# Patient Record
Sex: FEMALE | Race: BLACK OR AFRICAN AMERICAN | NOT HISPANIC OR LATINO | URBAN - METROPOLITAN AREA
[De-identification: names, ages, dates, MRNs, and addresses within clinical notes are randomized per-mention and may not be internally consistent; named-entity substitution may affect disease eponyms.]

---

## 2017-04-29 ENCOUNTER — EMERGENCY (EMERGENCY)
Facility: HOSPITAL | Age: 42
LOS: 1 days | Discharge: HOME | End: 2017-04-29
Attending: EMERGENCY MEDICINE
Payer: MEDICAID

## 2017-04-29 VITALS
HEIGHT: 64 IN | TEMPERATURE: 99 F | OXYGEN SATURATION: 100 % | WEIGHT: 149.91 LBS | RESPIRATION RATE: 19 BRPM | HEART RATE: 100 BPM | DIASTOLIC BLOOD PRESSURE: 69 MMHG | SYSTOLIC BLOOD PRESSURE: 132 MMHG

## 2017-04-29 DIAGNOSIS — Z98.89 OTHER SPECIFIED POSTPROCEDURAL STATES: Chronic | ICD-10-CM

## 2017-04-29 DIAGNOSIS — B36.9 SUPERFICIAL MYCOSIS, UNSPECIFIED: ICD-10-CM

## 2017-04-29 DIAGNOSIS — Z98.51 TUBAL LIGATION STATUS: ICD-10-CM

## 2017-04-29 DIAGNOSIS — M54.5 LOW BACK PAIN: ICD-10-CM

## 2017-04-29 DIAGNOSIS — Z98.51 TUBAL LIGATION STATUS: Chronic | ICD-10-CM

## 2017-04-29 DIAGNOSIS — A41.9 SEPSIS, UNSPECIFIED ORGANISM: ICD-10-CM

## 2017-04-29 DIAGNOSIS — Z98.890 OTHER SPECIFIED POSTPROCEDURAL STATES: Chronic | ICD-10-CM

## 2017-04-29 DIAGNOSIS — Z98.890 OTHER SPECIFIED POSTPROCEDURAL STATES: ICD-10-CM

## 2017-04-29 PROCEDURE — 99283 EMERGENCY DEPT VISIT LOW MDM: CPT

## 2017-04-29 RX ORDER — METHOCARBAMOL 500 MG/1
750 TABLET, FILM COATED ORAL ONCE
Qty: 0 | Refills: 0 | Status: COMPLETED | OUTPATIENT
Start: 2017-04-29 | End: 2017-04-29

## 2017-04-29 RX ORDER — DIPHENHYDRAMINE HCL 50 MG
50 CAPSULE ORAL ONCE
Qty: 0 | Refills: 0 | Status: COMPLETED | OUTPATIENT
Start: 2017-04-29 | End: 2017-04-29

## 2017-04-29 RX ADMIN — Medication 60 MILLIGRAM(S): at 22:58

## 2017-04-29 RX ADMIN — METHOCARBAMOL 750 MILLIGRAM(S): 500 TABLET, FILM COATED ORAL at 22:58

## 2017-04-29 RX ADMIN — Medication 50 MILLIGRAM(S): at 22:57

## 2017-04-29 NOTE — ED STATDOCS - MEDICAL DECISION MAKING DETAILS
Likely allergic rash, will treat with Benadryl. Will treat back pain with steroid and muscle relaxant, check XR, and re-assess.

## 2017-04-29 NOTE — ED STATDOCS - PROGRESS NOTE DETAILS
Pt reports significant relief of presenting symptoms. Pt able to bear weight and ambulate in the ED without difficulty. Pt stable for d/c with spine center f/u.

## 2017-04-29 NOTE — ED STATDOCS - OBJECTIVE STATEMENT
40 y/o female presents to ED c/o left lower back pain x 2 days, exacerbated with movement and bending forward at the waist. 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 Denies difficulty urinating. Pt has not taken any medication for pain. She also notes pruritic rash to her buttock, posterior thighs, and vaginal area that began today. She denies applying new cream or using new soap. No PMHx. No further complaints at this time.

## 2017-04-29 NOTE — ED STATDOCS - NS ED MD SCRIBE ATTENDING SCRIBE SECTIONS
VITAL SIGNS( Pullset)/HIV/PHYSICAL EXAM/DISPOSITION/REVIEW OF SYSTEMS/HISTORY OF PRESENT ILLNESS/PAST MEDICAL/SURGICAL/SOCIAL HISTORY

## 2017-04-29 NOTE — ED STATDOCS - ATTENDING CONTRIBUTION TO CARE
I, Blanca Francisco, performed the initial face to face bedside interview with this patient regarding history of present illness, review of symptoms and relevant past medical, social and family history.  I completed an independent physical examination.  I was the initial provider who evaluated this patient. I have signed out the follow up of any pending tests (i.e. labs, radiological studies) to the ACP.  I have communicated the patient’s plan of care and disposition with the ACP.  The history, relevant review of systems, past medical and surgical history, medical decision making, and physical examination was documented by the scribe in my presence and I attest to the accuracy of the documentation.

## 2017-04-30 PROCEDURE — 99283 EMERGENCY DEPT VISIT LOW MDM: CPT | Mod: 25

## 2017-04-30 PROCEDURE — 72100 X-RAY EXAM L-S SPINE 2/3 VWS: CPT

## 2017-04-30 PROCEDURE — 72100 X-RAY EXAM L-S SPINE 2/3 VWS: CPT | Mod: 26

## 2017-04-30 RX ORDER — METHOCARBAMOL 500 MG/1
2 TABLET, FILM COATED ORAL
Qty: 18 | Refills: 0 | OUTPATIENT
Start: 2017-04-30 | End: 2017-05-03

## 2017-04-30 RX ORDER — IBUPROFEN 200 MG
1 TABLET ORAL
Qty: 28 | Refills: 0 | OUTPATIENT
Start: 2017-04-30 | End: 2017-05-07

## 2017-05-02 ENCOUNTER — INPATIENT (INPATIENT)
Facility: HOSPITAL | Age: 42
LOS: 3 days | Discharge: HOME | DRG: 872 | End: 2017-05-06
Attending: INTERNAL MEDICINE | Admitting: HOSPITALIST
Payer: MEDICAID

## 2017-05-02 VITALS
DIASTOLIC BLOOD PRESSURE: 75 MMHG | HEART RATE: 114 BPM | HEIGHT: 64 IN | RESPIRATION RATE: 25 BRPM | OXYGEN SATURATION: 100 % | WEIGHT: 149.91 LBS | SYSTOLIC BLOOD PRESSURE: 116 MMHG | TEMPERATURE: 105 F

## 2017-05-02 DIAGNOSIS — Z98.890 OTHER SPECIFIED POSTPROCEDURAL STATES: Chronic | ICD-10-CM

## 2017-05-02 DIAGNOSIS — Z98.51 TUBAL LIGATION STATUS: Chronic | ICD-10-CM

## 2017-05-02 DIAGNOSIS — Z98.89 OTHER SPECIFIED POSTPROCEDURAL STATES: Chronic | ICD-10-CM

## 2017-05-02 LAB
ALBUMIN SERPL ELPH-MCNC: 4.1 G/DL — SIGNIFICANT CHANGE UP (ref 3.3–5.2)
ALP SERPL-CCNC: 60 U/L — SIGNIFICANT CHANGE UP (ref 40–120)
ALT FLD-CCNC: 23 U/L — SIGNIFICANT CHANGE UP
ANION GAP SERPL CALC-SCNC: 13 MMOL/L — SIGNIFICANT CHANGE UP (ref 5–17)
ANISOCYTOSIS BLD QL: SLIGHT — SIGNIFICANT CHANGE UP
AST SERPL-CCNC: 54 U/L — HIGH
BILIRUB SERPL-MCNC: 0.3 MG/DL — LOW (ref 0.4–2)
BUN SERPL-MCNC: 8 MG/DL — SIGNIFICANT CHANGE UP (ref 8–20)
CALCIUM SERPL-MCNC: 9.2 MG/DL — SIGNIFICANT CHANGE UP (ref 8.6–10.2)
CHLORIDE SERPL-SCNC: 94 MMOL/L — LOW (ref 98–107)
CO2 SERPL-SCNC: 23 MMOL/L — SIGNIFICANT CHANGE UP (ref 22–29)
CREAT SERPL-MCNC: 0.85 MG/DL — SIGNIFICANT CHANGE UP (ref 0.5–1.3)
DACRYOCYTES BLD QL SMEAR: SLIGHT — SIGNIFICANT CHANGE UP
ELLIPTOCYTES BLD QL SMEAR: SLIGHT — SIGNIFICANT CHANGE UP
EOSINOPHIL NFR BLD AUTO: 1 % — SIGNIFICANT CHANGE UP (ref 0–5)
GLUCOSE SERPL-MCNC: 100 MG/DL — SIGNIFICANT CHANGE UP (ref 70–115)
HCG SERPL-ACNC: <2 MIU/ML — SIGNIFICANT CHANGE UP
HCT VFR BLD CALC: 30.6 % — LOW (ref 37–47)
HGB BLD-MCNC: 9.8 G/DL — LOW (ref 12–16)
HYPOCHROMIA BLD QL: SIGNIFICANT CHANGE UP
LACTATE BLDV-MCNC: 3 MMOL/L — HIGH (ref 0.7–2)
LIDOCAIN IGE QN: 47 U/L — SIGNIFICANT CHANGE UP (ref 22–51)
LYMPHOCYTES # BLD AUTO: 15 % — LOW (ref 20–55)
MAGNESIUM SERPL-MCNC: 2 MG/DL — SIGNIFICANT CHANGE UP (ref 1.8–2.5)
MCHC RBC-ENTMCNC: 20.9 PG — LOW (ref 27–31)
MCHC RBC-ENTMCNC: 32 G/DL — SIGNIFICANT CHANGE UP (ref 32–36)
MCV RBC AUTO: 65.1 FL — LOW (ref 81–99)
MICROCYTES BLD QL: SLIGHT — SIGNIFICANT CHANGE UP
MONOCYTES NFR BLD AUTO: 2 % — LOW (ref 3–10)
NEUTROPHILS NFR BLD AUTO: 82 % — HIGH (ref 37–73)
OVALOCYTES BLD QL SMEAR: SLIGHT — SIGNIFICANT CHANGE UP
PLAT MORPH BLD: NORMAL — SIGNIFICANT CHANGE UP
PLATELET # BLD AUTO: 150 K/UL — SIGNIFICANT CHANGE UP (ref 150–400)
POIKILOCYTOSIS BLD QL AUTO: SLIGHT — SIGNIFICANT CHANGE UP
POTASSIUM SERPL-MCNC: 6.1 MMOL/L — CRITICAL HIGH (ref 3.5–5.3)
POTASSIUM SERPL-SCNC: 6.1 MMOL/L — CRITICAL HIGH (ref 3.5–5.3)
PROT SERPL-MCNC: 8.3 G/DL — SIGNIFICANT CHANGE UP (ref 6.6–8.7)
RBC # BLD: 4.7 M/UL — SIGNIFICANT CHANGE UP (ref 4.4–5.2)
RBC # FLD: 20.1 % — HIGH (ref 11–15.6)
RBC BLD AUTO: ABNORMAL
SODIUM SERPL-SCNC: 130 MMOL/L — LOW (ref 135–145)
TARGETS BLD QL SMEAR: SLIGHT — SIGNIFICANT CHANGE UP
TSH SERPL-MCNC: 0.19 UIU/ML — LOW (ref 0.27–4.2)
WBC # BLD: 3.4 K/UL — LOW (ref 4.8–10.8)
WBC # FLD AUTO: 3.4 K/UL — LOW (ref 4.8–10.8)

## 2017-05-02 PROCEDURE — 72157 MRI CHEST SPINE W/O & W/DYE: CPT | Mod: 26

## 2017-05-02 PROCEDURE — 99291 CRITICAL CARE FIRST HOUR: CPT

## 2017-05-02 PROCEDURE — 71010: CPT | Mod: 26

## 2017-05-02 RX ORDER — MEROPENEM 1 G/30ML
1000 INJECTION INTRAVENOUS ONCE
Qty: 0 | Refills: 0 | Status: COMPLETED | OUTPATIENT
Start: 2017-05-02 | End: 2017-05-02

## 2017-05-02 RX ORDER — SODIUM CHLORIDE 9 MG/ML
3000 INJECTION INTRAMUSCULAR; INTRAVENOUS; SUBCUTANEOUS ONCE
Qty: 0 | Refills: 0 | Status: COMPLETED | OUTPATIENT
Start: 2017-05-02 | End: 2017-05-02

## 2017-05-02 RX ORDER — ACETAMINOPHEN 500 MG
975 TABLET ORAL ONCE
Qty: 0 | Refills: 0 | Status: COMPLETED | OUTPATIENT
Start: 2017-05-02 | End: 2017-05-02

## 2017-05-02 RX ORDER — FENTANYL CITRATE 50 UG/ML
50 INJECTION INTRAVENOUS ONCE
Qty: 0 | Refills: 0 | Status: DISCONTINUED | OUTPATIENT
Start: 2017-05-02 | End: 2017-05-02

## 2017-05-02 RX ORDER — VANCOMYCIN HCL 1 G
1500 VIAL (EA) INTRAVENOUS ONCE
Qty: 0 | Refills: 0 | Status: COMPLETED | OUTPATIENT
Start: 2017-05-02 | End: 2017-05-03

## 2017-05-02 RX ORDER — IBUPROFEN 200 MG
800 TABLET ORAL ONCE
Qty: 0 | Refills: 0 | Status: COMPLETED | OUTPATIENT
Start: 2017-05-02 | End: 2017-05-02

## 2017-05-02 RX ORDER — SODIUM CHLORIDE 9 MG/ML
3 INJECTION INTRAMUSCULAR; INTRAVENOUS; SUBCUTANEOUS ONCE
Qty: 0 | Refills: 0 | Status: COMPLETED | OUTPATIENT
Start: 2017-05-02 | End: 2017-05-02

## 2017-05-02 RX ADMIN — Medication 800 MILLIGRAM(S): at 22:16

## 2017-05-02 RX ADMIN — SODIUM CHLORIDE 3000 MILLILITER(S): 9 INJECTION INTRAMUSCULAR; INTRAVENOUS; SUBCUTANEOUS at 22:16

## 2017-05-02 RX ADMIN — MEROPENEM 200 MILLIGRAM(S): 1 INJECTION INTRAVENOUS at 22:24

## 2017-05-02 RX ADMIN — Medication 800 MILLIGRAM(S): at 22:50

## 2017-05-02 RX ADMIN — SODIUM CHLORIDE 3 MILLILITER(S): 9 INJECTION INTRAMUSCULAR; INTRAVENOUS; SUBCUTANEOUS at 22:14

## 2017-05-02 RX ADMIN — Medication 975 MILLIGRAM(S): at 22:16

## 2017-05-02 RX ADMIN — FENTANYL CITRATE 50 MICROGRAM(S): 50 INJECTION INTRAVENOUS at 22:16

## 2017-05-02 RX ADMIN — FENTANYL CITRATE 50 MICROGRAM(S): 50 INJECTION INTRAVENOUS at 22:50

## 2017-05-02 NOTE — ED PROVIDER NOTE - PROGRESS NOTE DETAILS
Received call from MRI tech pt is refusing MRI. when pt returns will discuss importance of MRI. explained importance of mri and that pt could be paralyzed w/o it. pt will be admitted for high fever, less likely but given high fever and mucosal lesions possibility of Nato Johnsons syndrome exists. will give steroids and benadryl and see what medicine thinks. If they agree Monty Lopez will transfer to [facility] burn [place]

## 2017-05-02 NOTE — ED PROVIDER NOTE - MEDICAL DECISION MAKING DETAILS
Sepsis workup: Empiric abx, IV fluids, Antipyretics, admit to hospital and re-assess Sepsis workup: Empiric abx, IV fluids, Antipyretics, admit to hospital and re-assess,

## 2017-05-02 NOTE — ED PROVIDER NOTE - NS ED MD SCRIBE ATTENDING SCRIBE SECTIONS
HIV/VITAL SIGNS( Pullset)/HISTORY OF PRESENT ILLNESS/PAST MEDICAL/SURGICAL/SOCIAL HISTORY/DISPOSITION/PHYSICAL EXAM/REVIEW OF SYSTEMS

## 2017-05-02 NOTE — ED PROVIDER NOTE - CRITICAL CARE PROVIDED
interpretation of diagnostic studies/documentation/consultation with other physicians/direct patient care (not related to procedure)/additional history taking

## 2017-05-02 NOTE — ED ADULT NURSE NOTE - OBJECTIVE STATEMENT
patient states has back pain, had allergic rxn to unknown arms since saturday, patient with fever taking motrin, last dose 4 hours prior to arrival,  today 2 hours prior to arrival as per patient, sepsis protocol initiated due to fever

## 2017-05-02 NOTE — ED PROVIDER NOTE - CARE PLAN
Principal Discharge DX:	Sepsis, due to unspecified organism  Secondary Diagnosis:	Cellulitis, unspecified cellulitis site

## 2017-05-02 NOTE — ED PROVIDER NOTE - OBJECTIVE STATEMENT
40 y/o female BIBA presenting w/ 105F fever and severe low back pain. Pt in distress from pain. Denies dysuria, vaginal discharge. Called to bedside for code sepsis. No further complaints at this time.

## 2017-05-03 DIAGNOSIS — A41.9 SEPSIS, UNSPECIFIED ORGANISM: ICD-10-CM

## 2017-05-03 DIAGNOSIS — L03.90 CELLULITIS, UNSPECIFIED: ICD-10-CM

## 2017-05-03 DIAGNOSIS — N73.0 ACUTE PARAMETRITIS AND PELVIC CELLULITIS: ICD-10-CM

## 2017-05-03 DIAGNOSIS — R50.9 FEVER, UNSPECIFIED: ICD-10-CM

## 2017-05-03 DIAGNOSIS — D64.9 ANEMIA, UNSPECIFIED: ICD-10-CM

## 2017-05-03 DIAGNOSIS — R21 RASH AND OTHER NONSPECIFIC SKIN ERUPTION: ICD-10-CM

## 2017-05-03 DIAGNOSIS — M54.9 DORSALGIA, UNSPECIFIED: ICD-10-CM

## 2017-05-03 DIAGNOSIS — Z29.9 ENCOUNTER FOR PROPHYLACTIC MEASURES, UNSPECIFIED: ICD-10-CM

## 2017-05-03 LAB
ALBUMIN SERPL ELPH-MCNC: 3.2 G/DL — LOW (ref 3.3–5.2)
ALP SERPL-CCNC: 53 U/L — SIGNIFICANT CHANGE UP (ref 40–120)
ALT FLD-CCNC: 15 U/L — SIGNIFICANT CHANGE UP
ANION GAP SERPL CALC-SCNC: 13 MMOL/L — SIGNIFICANT CHANGE UP (ref 5–17)
APPEARANCE UR: CLEAR — SIGNIFICANT CHANGE UP
APTT BLD: 26 SEC — LOW (ref 27.5–37.4)
AST SERPL-CCNC: 25 U/L — SIGNIFICANT CHANGE UP
BILIRUB SERPL-MCNC: 0.2 MG/DL — LOW (ref 0.4–2)
BILIRUB UR-MCNC: NEGATIVE — SIGNIFICANT CHANGE UP
BUN SERPL-MCNC: 7 MG/DL — LOW (ref 8–20)
CALCIUM SERPL-MCNC: 7.7 MG/DL — LOW (ref 8.6–10.2)
CHLORIDE SERPL-SCNC: 108 MMOL/L — HIGH (ref 98–107)
CK SERPL-CCNC: 107 U/L — SIGNIFICANT CHANGE UP (ref 25–170)
CO2 SERPL-SCNC: 20 MMOL/L — LOW (ref 22–29)
COLOR SPEC: YELLOW — SIGNIFICANT CHANGE UP
CREAT SERPL-MCNC: 0.55 MG/DL — SIGNIFICANT CHANGE UP (ref 0.5–1.3)
CRP SERPL-MCNC: 0.3 MG/DL — SIGNIFICANT CHANGE UP (ref 0–0.4)
D DIMER BLD IA.RAPID-MCNC: 2934 NG/ML DDU — HIGH
DIFF PNL FLD: NEGATIVE — SIGNIFICANT CHANGE UP
ERYTHROCYTE [SEDIMENTATION RATE] IN BLOOD: 10 MM/HR — SIGNIFICANT CHANGE UP (ref 0–20)
FIBRINOGEN PPP-MCNC: 367 MG/DL — SIGNIFICANT CHANGE UP (ref 310–510)
GLUCOSE SERPL-MCNC: 126 MG/DL — HIGH (ref 70–115)
GLUCOSE UR QL: NEGATIVE MG/DL — SIGNIFICANT CHANGE UP
HIV 1 & 2 AB SERPL IA.RAPID: SIGNIFICANT CHANGE UP
INR BLD: 1.13 RATIO — SIGNIFICANT CHANGE UP (ref 0.88–1.16)
KETONES UR-MCNC: NEGATIVE — SIGNIFICANT CHANGE UP
LDH SERPL L TO P-CCNC: 273 U/L — HIGH (ref 98–192)
LEUKOCYTE ESTERASE UR-ACNC: NEGATIVE — SIGNIFICANT CHANGE UP
NITRITE UR-MCNC: NEGATIVE — SIGNIFICANT CHANGE UP
PH UR: 6 — SIGNIFICANT CHANGE UP (ref 5–8)
POTASSIUM SERPL-MCNC: 3.8 MMOL/L — SIGNIFICANT CHANGE UP (ref 3.5–5.3)
POTASSIUM SERPL-SCNC: 3.8 MMOL/L — SIGNIFICANT CHANGE UP (ref 3.5–5.3)
PROCALCITONIN SERPL-MCNC: 0.16 NG/ML — HIGH (ref 0–0.05)
PROT SERPL-MCNC: 6.8 G/DL — SIGNIFICANT CHANGE UP (ref 6.6–8.7)
PROT UR-MCNC: NEGATIVE MG/DL — SIGNIFICANT CHANGE UP
PROTHROM AB SERPL-ACNC: 12.5 SEC — SIGNIFICANT CHANGE UP (ref 9.8–12.7)
RPR SERPL-ACNC: SIGNIFICANT CHANGE UP
SODIUM SERPL-SCNC: 141 MMOL/L — SIGNIFICANT CHANGE UP (ref 135–145)
SP GR SPEC: 1 — LOW (ref 1.01–1.02)
T3 SERPL-MCNC: 68 NG/DL — LOW (ref 80–200)
T4 AB SER-ACNC: 5 UG/DL — SIGNIFICANT CHANGE UP (ref 4.5–12)
UROBILINOGEN FLD QL: NEGATIVE MG/DL — SIGNIFICANT CHANGE UP

## 2017-05-03 PROCEDURE — 93010 ELECTROCARDIOGRAM REPORT: CPT

## 2017-05-03 PROCEDURE — 99223 1ST HOSP IP/OBS HIGH 75: CPT

## 2017-05-03 PROCEDURE — 74176 CT ABD & PELVIS W/O CONTRAST: CPT | Mod: 26

## 2017-05-03 PROCEDURE — 99223 1ST HOSP IP/OBS HIGH 75: CPT | Mod: AI

## 2017-05-03 PROCEDURE — 72131 CT LUMBAR SPINE W/O DYE: CPT | Mod: 26

## 2017-05-03 PROCEDURE — 71275 CT ANGIOGRAPHY CHEST: CPT | Mod: 26

## 2017-05-03 PROCEDURE — 76856 US EXAM PELVIC COMPLETE: CPT | Mod: 26

## 2017-05-03 PROCEDURE — 12345: CPT | Mod: NC

## 2017-05-03 PROCEDURE — 72158 MRI LUMBAR SPINE W/O & W/DYE: CPT | Mod: 26

## 2017-05-03 RX ORDER — FERROUS SULFATE 325(65) MG
325 TABLET ORAL DAILY
Qty: 0 | Refills: 0 | Status: DISCONTINUED | OUTPATIENT
Start: 2017-05-03 | End: 2017-05-06

## 2017-05-03 RX ORDER — DIPHENHYDRAMINE HCL 50 MG
50 CAPSULE ORAL ONCE
Qty: 0 | Refills: 0 | Status: COMPLETED | OUTPATIENT
Start: 2017-05-03 | End: 2017-05-03

## 2017-05-03 RX ORDER — VANCOMYCIN HCL 1 G
1000 VIAL (EA) INTRAVENOUS EVERY 8 HOURS
Qty: 0 | Refills: 0 | Status: DISCONTINUED | OUTPATIENT
Start: 2017-05-03 | End: 2017-05-06

## 2017-05-03 RX ORDER — ACETAMINOPHEN 500 MG
650 TABLET ORAL EVERY 6 HOURS
Qty: 0 | Refills: 0 | Status: DISCONTINUED | OUTPATIENT
Start: 2017-05-03 | End: 2017-05-06

## 2017-05-03 RX ORDER — DIPHENHYDRAMINE HCL 50 MG
25 CAPSULE ORAL ONCE
Qty: 0 | Refills: 0 | Status: COMPLETED | OUTPATIENT
Start: 2017-05-03 | End: 2017-05-03

## 2017-05-03 RX ORDER — DEXAMETHASONE 0.5 MG/5ML
10 ELIXIR ORAL ONCE
Qty: 0 | Refills: 0 | Status: COMPLETED | OUTPATIENT
Start: 2017-05-03 | End: 2017-05-03

## 2017-05-03 RX ORDER — PIPERACILLIN AND TAZOBACTAM 4; .5 G/20ML; G/20ML
3.38 INJECTION, POWDER, LYOPHILIZED, FOR SOLUTION INTRAVENOUS EVERY 8 HOURS
Qty: 0 | Refills: 0 | Status: DISCONTINUED | OUTPATIENT
Start: 2017-05-03 | End: 2017-05-06

## 2017-05-03 RX ORDER — ASCORBIC ACID 60 MG
500 TABLET,CHEWABLE ORAL DAILY
Qty: 0 | Refills: 0 | Status: DISCONTINUED | OUTPATIENT
Start: 2017-05-03 | End: 2017-05-06

## 2017-05-03 RX ORDER — SODIUM CHLORIDE 9 MG/ML
1000 INJECTION INTRAMUSCULAR; INTRAVENOUS; SUBCUTANEOUS
Qty: 0 | Refills: 0 | Status: DISCONTINUED | OUTPATIENT
Start: 2017-05-03 | End: 2017-05-04

## 2017-05-03 RX ADMIN — Medication 110 MILLIGRAM(S): at 10:40

## 2017-05-03 RX ADMIN — Medication 500 MILLIGRAM(S): at 14:52

## 2017-05-03 RX ADMIN — SODIUM CHLORIDE 125 MILLILITER(S): 9 INJECTION INTRAMUSCULAR; INTRAVENOUS; SUBCUTANEOUS at 10:40

## 2017-05-03 RX ADMIN — Medication 650 MILLIGRAM(S): at 15:42

## 2017-05-03 RX ADMIN — Medication 102 MILLIGRAM(S): at 05:50

## 2017-05-03 RX ADMIN — PIPERACILLIN AND TAZOBACTAM 25 GRAM(S): 4; .5 INJECTION, POWDER, LYOPHILIZED, FOR SOLUTION INTRAVENOUS at 15:42

## 2017-05-03 RX ADMIN — Medication 325 MILLIGRAM(S): at 14:52

## 2017-05-03 RX ADMIN — Medication 250 MILLIGRAM(S): at 12:52

## 2017-05-03 RX ADMIN — Medication 25 MILLIGRAM(S): at 22:18

## 2017-05-03 RX ADMIN — Medication 650 MILLIGRAM(S): at 23:00

## 2017-05-03 RX ADMIN — Medication 50 MILLIGRAM(S): at 05:17

## 2017-05-03 RX ADMIN — Medication 650 MILLIGRAM(S): at 22:28

## 2017-05-03 RX ADMIN — Medication 650 MILLIGRAM(S): at 15:47

## 2017-05-03 RX ADMIN — Medication 300 MILLIGRAM(S): at 02:44

## 2017-05-03 RX ADMIN — Medication 250 MILLIGRAM(S): at 22:26

## 2017-05-03 NOTE — ED ADULT NURSE REASSESSMENT NOTE - GENERAL PATIENT STATE
comfortable appearance/improvement verbalized/resting/sleeping/family/SO at bedside
family/SO at bedside/resting/sleeping/improvement verbalized

## 2017-05-03 NOTE — CONSULT NOTE ADULT - PROBLEM SELECTOR RECOMMENDATION 9
Mgmt as per primary team - Possibly 2/2 cellulitis vs. SJS vs. PID
Blood cultures pending  No fevers since  Patient had CT Chest/Abdomen/ Pelvis with anterior pelvic cellulitis  Patient has D&C 2 weeks back  Will have GYN consult  Continue Vancomycin and Zosyn for now  Follow up GC/Chlam  UA with no UTI  no leukocytosis  Follow up cultures  No fevers since starting antibiotics

## 2017-05-03 NOTE — CONSULT NOTE ADULT - PROBLEM SELECTOR RECOMMENDATION 3
Mgmt as per primary team
Dermatology Consult  RPR: Non reactive  HIV: Non reactive  Follow up rest of the work up

## 2017-05-03 NOTE — CONSULT NOTE ADULT - PROBLEM SELECTOR RECOMMENDATION 6
Mgmt as per primary team for VTE ppx - encourage ambulation as tolerated; consider pharmacological ppx if anticipating over 48-72h hospital stay

## 2017-05-03 NOTE — CONSULT NOTE ADULT - SUBJECTIVE AND OBJECTIVE BOX
[facility]  INFECTIOUS DISEASES AND INTERNAL MEDICINE OF [place]  =======================================================  Cameron Hoffman MD  Diplomates American Board of Internal Medicine and Infectious Diseases  =======================================================      MRN-[id number]  SHAWNA ZARAGOZA       40y/o  Female comes to the ER for a worsening rash since the past few days. Patient was taking benadryl with no improvement in symptoms. Her lips started to swell and rash continued to worsen so she decided to come to the ER. At home patient also noted a fever. In the ER here she had a fever to 105. The rash started on her back and continued to spread. Initially with no fevers. The rash is itchy. Patient has no pets at home. no recent travel. no outdoor activities.      Past Medical & Surgical Hx:  Anemia, unspecified type  Back pain  No pertinent past medical history  S/P dilation and curettage  S/P tubal ligation  S/P  section: x 4      Social Hx:  Denies ETOH, Drug use, Smoking      FAMILY HISTORY:  Family history of hypertension in mother (Father, Mother): and father  No pertinent family history in first degree relatives      Allergies  No Known Allergies      Antibiotics:  vancomycin  IVPB 1000milliGRAM(s) IV Intermittent every 8 hours  piperacillin/tazobactam IVPB. 3.375Gram(s) IV Intermittent every 8 hours       REVIEW OF SYSTEMS:  CONSTITUTIONAL:  + Fever  HEENT:  No diplopia or blurred vision. No earache, sore throat or runny nose.  CARDIOVASCULAR:  No pressure, squeezing, strangling, tightness, heaviness or aching about the chest, neck, axilla or epigastrium.  RESPIRATORY:  No cough, shortness of breath  GASTROINTESTINAL:  No nausea, vomiting or diarrhea.  GENITOURINARY:  No dysuria, frequency or urgency.  MUSCULOSKELETAL:  no joint aches, no muscle pain  SKIN:  No change in skin, hair or nails.  NEUROLOGIC:  No paresthesias, fasciculations, seizures or weakness.  PSYCHIATRIC:  No disorder of thought or mood.  ENDOCRINE:  No heat or cold intolerance, polyuria or polydipsia.  HEMATOLOGICAL:  No easy bruising or bleeding.       Physical Exam:  Vital Signs Last 24 Hrs  T(C): 36.9, Max: 40.7 ( @ 22:09)  T(F): 98.4, Max: 105.2 ( @ 22:09)  HR: 78 (78 - 114)  BP: 105/72 (89/55 - 120/65)  RR: 17 (16 - 25)  SpO2: 99% (96% - 100%)  Height (cm): 162.6 ( @ 21:50)  Weight (kg): 68 ( @ 21:50)  BMI (kg/m2): 25.7 ( @ 21:50)  BSA (m2): 1.73 ( @ 21:50)      GEN: NAD, pleasant  HEENT: normocephalic and atraumatic. EOMI. PERRL.    NECK: Supple.   LUNGS: Clear to auscultation.  HEART: Regular rate and rhythm without murmur.  ABDOMEN: Soft, nontender, and nondistended.  Positive bowel sounds.    : No CVA tenderness  MSK: No joint swelling  EXTREMITIES: Without any cyanosis, clubbing, lesions or edema.  NEUROLOGIC: Cranial nerves II through XII are grossly intact.  PSYCHIATRIC: Appropriate affect .  SKIN: Diffuse rash      Labs:    ESR: 10  CRP: 0.3  RPR: Non reactive  HIV: Non reactive      141  |  108<H>  |  7.0<L>  ----------------------------<  126<H>  3.8   |  20.0<L>  |  0.55    Ca    7.7<L>      03 May 2017 05:47  Mg     2.0         TPro  6.8  /  Alb  3.2<L>  /  TBili  0.2<L>  /  DBili  x   /  AST  25  /  ALT  15  /  AlkPhos  53                            9.8    3.4   )-----------( 150      ( 02 May 2017 22:25 )             30.6       PT/INR - ( 03 May 2017 11:36 )   PT: 12.5 sec;   INR: 1.13 ratio         PTT - ( 03 May 2017 11:36 )  PTT:26.0 sec  Urinalysis Basic - ( 03 May 2017 04:35 )    Color: Yellow / Appearance: Clear / S.005 / pH: x  Gluc: x / Ketone: Negative  / Bili: Negative / Urobili: Negative mg/dL   Blood: x / Protein: Negative mg/dL / Nitrite: Negative   Leuk Esterase: Negative / RBC: x / WBC x   Sq Epi: x / Non Sq Epi: x / Bacteria: x      LIVER FUNCTIONS - ( 03 May 2017 05:47 )  Alb: 3.2 g/dL / Pro: 6.8 g/dL / ALK PHOS: 53 U/L / ALT: 15 U/L / AST: 25 U/L / GGT: x           CARDIAC MARKERS ( 03 May 2017 05:47 )  x     / x     / 107 U/L / x     / x          EXAM:  CT ANGIO CHEST (W)AW IC                        PROCEDURE DATE:  2017    INTERPRETATION:  CTA chest .  COMPARISON: None.  CLINICAL INFORMATION: chest pain, dyspnea.  TECHNIQUE: Contiguous axial 1.25 mm slice thickness images of the chest   were obtained after intravenous contrast administration utilizing PE   protocol.  Maximum intensity projection,(MIP) ,  imaging was created and interpreted.  100 mls of Omnipaque 300 was administered intravenously without   complication and 0 mls were discarded.  FINDINGS:  There are no pulmonary arterial filling defects to suggest pulmonary   embolism.  The mediastinum great vessels are normal.   There are no mediastinal masses or lymphadenopathy.   The heart and pericardium are normal.  The airway is patent showing normal caliber and contour.  There are no airspace consolidations.  There is no pleural effusion or pneumothorax.  Visualized upper abdominal viscera unremarkable.  The bones are normal.  IMPRESSION:  No evidence of pulmonary embolism.      EXAM:  CT REFORM SPINE L                        EXAM:  CT ABDOMEN AND PELVIS                        PROCEDURE DATE:  2017    INTERPRETATION:  CLINICAL INDICATION: Flank and back pain. Fever.  COMPARISON: None.  PROCEDURE: CT of the abdomen and pelvis was performed without the   administration of intravenous contrast. Reconstructed images of the   lumbar spine were obtained. Coronal and sagittal reformatted images were   submitted.  FINDINGS:  Solid organ evaluation is suboptimal inherent to noncontrast CT   technique.   LUNGS: Bibasilar dependent changes are present. No pleural effusion.  LIVER/GALLBLADDER: Normal liver size. There is no intrahepatic or   extrahepatic biliary ductal dilatation. Unremarkable gallbladder.  PANCREAS: No pancreatic ductal dilatation, peripancreatic fluid   collection, or focal pancreatic mass.  SPLEEN: Normal size.  KIDNEYS: No hydronephrosis, perinephric stranding, or fluid collection.  ADRENAL GLANDS: Unremarkable.  BOWEL: Normal appendix. No bowel obstruction or free intraperitoneal air.   URINARY BLADDER: Moderately distended.  PELVIC ORGANS: Bulky heterogeneous uterus with possible underlying   fibroids is elevated by the urinary bladder.  LYMPH NODES: Bilateral inguinal adenopathy with lymph nodes measuring up   to 1.2 mm in short axis. Mild subcutaneous fat stranding in the anterior   pelvic wall may reflect colitis.  BONES/SOFT TISSUES: Images of the lumbar spine demonstrates no osseous   erosion or evidence of discitis osteomyelitis. No suspicious osseous   lesion.  Diastases of the rectus abdominis musculature.  AORTA: No aneurysmal dilatation.   IMPRESSION:    CT ABDOMEN PELVIS: Bilateral inguinal adenopathy and cellulitic changes   in the anterior pelvic soft tissues.  No hydronephrosis or urinary tract calculus.  CT LUMBAR SPINE: Unremarkable.      EXAM:  THORACIC SPINE(MRI)W WO CON                        PROCEDURE DATE:  2017    INTERPRETATION:  CLINICAL HISTORY: Severe lower back pain, high fever,   rule out abscess  COMPARISON: None.  TECHNIQUE: Thoracic spine MRI: Multiplanar, multisequence MR images of   the thoracic spine without the administration of intravenous gadolinium.   Incomplete examination. The patient refused the complete scan as well as   the administration of contrast.  FINDINGS:      Sagittal T1, T2, and STIR sequences are obtained only. No bone marrow   edema or ligamentous edema. The visualized spinal cord is normal caliber   and signal intensity. Vertebral body heights are maintained. Vertebral   disc signal is preserved. No soft tissue abnormality.  IMPRESSION: Limited study. No evidence of discitis/osteomyelitis.        EXAM:  LUMBAR SPINE(MRI)W WO CON                        PROCEDURE DATE:  2017    INTERPRETATION:  CLINICAL HISTORY: Abscess, sepsis  COMPARISON: CT lumbar spine dated 5/3/2017.  TECHNIQUE: Lumbar spine MRI: Multiplanar, multisequence MR images of the   lumbar spine with and without the administration of 6 cc intravenous   Gadavist contrast. 1.5 cc of contrast was discarded  FINDINGS:  No bone marrow edema or ligamentous edema. The conus medullaris ends at   the T12/L1 level. No abnormal osseous, leptomeningeal, or soft tissue   enhancement. No evidence of discitis/osteomyelitis. Normal T1 marrow   signal. Focal area of sclerosis in the thoracic vertebral body, better   seen on the recent CT.  Evaluation of the individual levels:  L1/L2 level:    No disc herniation, spinal canal stenosis, or foraminal   narrowing.  L2/L3 level:    No disc herniation, spinal canal stenosis, or foraminal   narrowing.  L3/L4 level:    No disc herniation, spinal canal stenosis, or foraminal   narrowing.  L4/L5 level: Small right of midline disc protrusion. No spinal canal   stenosis or foraminal narrowing.  L5/S1 level:    No disc herniation, spinal canal stenosis, or foraminal   narrowing.  Enlarged heterogeneous uterus is partially visualized.  IMPRESSION: No evidence of discitis/osteomyelitis.        EXAM:  US PELVIC COMPLETE                        PROCEDURE DATE:  2017    INTERPRETATION:  CLINICAL INFORMATION: Pelvic pain,concern for PID;   cervix w/ white papules, + cervical motion tenderness-, flank pain  LMP:   COMPARISON: CT on the same day  TECHNIQUE: Transabdominalpelvic sonogram using grey scale, color and   spectral doppler technique. The patient refused endovaginal ultrasound.  FINDINGS:  Uterus: 13.6 x 6.2 x 7.2 cm. heterogeneous and globular with poorly   defined zonal anatomy.  Endometrium: 7 mm. Poorly delineated transabdominally.  Right ovary: 2.1 x 1.2 x 2.6 cm. Within normal limits.  Left ovary: 2.8 x 1.4 x 2.6 cm. Within normal limits.  Free fluid: None.  IMPRESSION:  Enlarged heterogeneous uterus which may reflect adenomyosis. Evaluation   of the endometrium and adnexa was limited due to patient's refusal of   endovaginal ultrasound, however no gross adnexal mass.        EXAM:  CHEST SINGLE VIEW FRONTAL                        PROCEDURE DATE:  2017    INTERPRETATION:  TECHNIQUE: Single portable view of the chest.  COMPARISON: None.  CLINICAL HISTORY: Fever, lower back pain.   FINDINGS:  Single frontal view of the chest demonstrates the lungs to be clear. The   cardiomediastinal silhouette is normal. No acute osseous abnormalities.   IMPRESSION: No acute cardiopulmonary disease process.

## 2017-05-03 NOTE — PROGRESS NOTE ADULT - ASSESSMENT
42 Y/o  Female with fever (Tmax 105), diffuse non blanchable Rash, oral ulcers, vaginal ulcers, Cervical motion tenderness. - Possible Gonorrhea- PID    Admitted to medical unit with   - F/U ID consult  - IV abx Zosyn/vanco/doxy  - f/u vaginal cultures, oral cultures, blood cultures  - GYN/OB consult appreciated  - dexamethasone and benadryl given for possible allergic Rxn  - Cont IV hydration   - Acetaminophen PRN for fever

## 2017-05-03 NOTE — H&P ADULT - NSHPPHYSICALEXAM_GEN_ALL_CORE
Vital Signs Last 24 Hrs  T(C): 37.2, Max: 40.7 (05-02 @ 22:09)  T(F): 98.9, Max: 105.2 (05-02 @ 22:09)  HR: 96 (94 - 114)  BP: 94/62 (90/55 - 120/65)  BP(mean): --  RR: 18 (18 - 25)  SpO2: 100% (99% - 100%)    Constitutional/General:  female, who is in NAD, Well developed, well nourished, vitals reviewed  EYE: PERRL, conjunctiva clear  ENT: Good dentition, oropharyngeal ulcers, lip swollen   NECK: No masses, thyroid normal  RESP: CTAB, no wheezes, no rhonchi, normal effort  CV: RRR, normal S1S2, no murmur, 2+ DP pulses, no JVD, no edema  ABDOMEN: Soft, nontender, no HSM  LYMPH: No cervical or supraclavicular adenopathy  SKIN: Warm, dry, + rashes: non blanchable, Itcy, petechial covering the lumbosacral area, abdomen incl breast, chin and upper back and extremity.    NEURO: CN II-XII intact grossly, sensation intact  PSYCHIATRIC: Oriented x3, normal mood and affect

## 2017-05-03 NOTE — PROGRESS NOTE ADULT - SUBJECTIVE AND OBJECTIVE BOX
SHAWNA ZARAGOZA    [id number]    41y      Female    INTERVAL HPI/OVERNIGHT EVENTS:  This is a 41 year old female, HHA, who has medical history of anemia secondary to menorrhagia with irregular cycle and uterine leiomyoma status post Hysteroscopy with Dilation and curettage 16. 2 weeks ago she developed lumbar pain worsen with ambulation and bending, on 4 days ago she began to develop a rash which started on the lumbosacral area and spread to her groin, anterior thigh, abd, and upper extremity including her palms, the rash is very itchy and has associated muscular thigh pain, loss of appetite and for the past 3 day febrile (Tmax 105) which was also present on presentation. She reported to the hospital today as her lip became swollen in addition to the worsening of the noted complaints. She denies: recent illness, tampon use, multiple sexual partners, new medication, new soap or food ingestion, recent travel or hiking, smoking, alcohol or illicit drug use, wt loss, or history of vasculitis. She has a family history only significant for HTN.  Denies: headache, chest pain, diarrhea, constipation or dysuria.     Pt was seen and evaluated in ER on  at which time she was having back pain with newly developing rash on her lumbosacral and abdominal area, at that time she was discharged on Robaxin, benadryl and ibuprofen - she reported only minimal relief. She fevers began to develop on the following day.    Today, pt resting comfortably in bed.  Rash persists on pts back and hands/ wrists-puritic. Her lips are still swollen but decreased from earlier.  She continues to c/o back/lower abd pain.         REVIEW OF SYSTEMS:    CONSTITUTIONAL: + fever, no weight loss, or fatigue  RESPIRATORY: No cough, wheezing, hemoptysis; No shortness of breath  CARDIOVASCULAR: No chest pain, palpitations  GASTROINTESTINAL: + abdominal No epigastric pain. No nausea, vomiting  NEUROLOGICAL: No headaches, memory loss, loss of strength.        Vital Signs Last 24 Hrs  T(C): 37, Max: 40.7 (05-02 @ 22:09)  T(F): 98.6, Max: 105.2 (05-02 @ 22:09)  HR: 88 (80 - 114)  BP: 115/78 (89/55 - 120/65)  RR: 18 (16 - 25)  SpO2: 98% (96% - 100%)    PHYSICAL EXAM:    GENERAL: NAD, AA female well-groomed  HEENT: PERRL, +EOMI, lips swollen, + tonsillar exudate  NECK: soft, Supple, No JVD,   CHEST/LUNG: Clear to ausclutation bilaterally; No wheezing  HEART: S1S2+, Regular rate and rhythm; No murmurs, rubs, or gallops  ABDOMEN: Soft, Nontender, Nondistended; Bowel sounds present  EXTREMITIES:  2+ Peripheral Pulses, No clubbing, cyanosis, or edema  SKIN: macular papular rash on back, chin, abdomen, palms of hands extending to wrists  NEURO: AAOX3, no focal deficits, no motor r sensory loss  PSYCH: normal mood      LABS:                        9.8    3.4   )-----------( 150      ( 02 May 2017 22:25 )             30.6     05-03    141  |  108<H>  |  7.0<L>  ----------------------------<  126<H>  3.8   |  20.0<L>  |  0.55    Ca    7.7<L>      03 May 2017 05:47  Mg     2.0     05-    TPro  6.8  /  Alb  3.2<L>  /  TBili  0.2<L>  /  DBili  x   /  AST  25  /  ALT  15  /  AlkPhos  53  05-03    PT/INR - ( 03 May 2017 11:36 )   PT: 12.5 sec;   INR: 1.13 ratio         PTT - ( 03 May 2017 11:36 )  PTT:26.0 sec  Urinalysis Basic - ( 03 May 2017 04:35 )    D-dimer- 2934    Color: Yellow / Appearance: Clear / S.005 / pH: x  Gluc: x / Ketone: Negative  / Bili: Negative / Urobili: Negative mg/dL   Blood: x / Protein: Negative mg/dL / Nitrite: Negative   Leuk Esterase: Negative / RBC: x / WBC x   Sq Epi: x / Non Sq Epi: x / Bacteria: x      HIV- nonreactive    MEDICATIONS  (STANDING):  vancomycin  IVPB 1000milliGRAM(s) IV Intermittent every 8 hours  piperacillin/tazobactam IVPB. 3.375Gram(s) IV Intermittent every 8 hours  sodium chloride 0.9%. 1000milliLiter(s) IV Continuous <Continuous>  ferrous    sulfate 325milliGRAM(s) Oral daily  ascorbic acid 500milliGRAM(s) Oral daily    MEDICATIONS  (PRN):  acetaminophen   Tablet 650milliGRAM(s) Oral every 6 hours PRN For Temp greater than 38 C (100.4 F)      RADIOLOGY & ADDITIONAL TESTS:    CT ABDOMEN PELVIS: Bilateral inguinal adenopathy and cellulitic changes   in the anterior pelvic soft tissues.    No hydronephrosis or urinary tract calculus.    CT LUMBAR SPINE: Unremarkable.    MRI lumbar spine:  No evidence of discitis/osteomyelitis.    MRI thoracic spine:  Limited study. No evidence of discitis/osteomyelitis.    Pelvic sono  Enlarged heterogeneous uterus which may reflect adenomyosis. Evaluation   of the endometrium and adnexa was limited due to patient's refusal of   endovaginal ultrasound, however no gross adnexal mass.      CXR:  No acute cardiopulmonary disease process.    GYN consult appreciated

## 2017-05-03 NOTE — CONSULT NOTE ADULT - SUBJECTIVE AND OBJECTIVE BOX
As per ED HPI: 40 y/o female BIBA presenting w/ 105F fever and severe low back pain. Pt in distress from pain. Denies dysuria, vaginal discharge. Called to bedside for code sepsis. No further complaints at this time    Vital Signs Last 24 Hrs  T(C): 37.2, Max: 40.7 (05-02 @ 22:09)  T(F): 98.9, Max: 105.2 (05-02 @ 22:09)  HR: 96 (94 - 114)  BP: 94/62 (90/55 - 120/65)  BP(mean): --  RR: 18 (18 - 25)  SpO2: 100% (99% - 100%)    Physical Exam:  : external genitalia w/ diffuse maculopapular rash, non tender;  upon speculum exam, milky white discharge noted, cervix retroverted w/ mild inflammatory changes and multiple pinpoint white papules noted on ectocervix - GC/Chlamydia, Affirm vaginitis and general cervical cultures collected  Upon digital exam +CMT, +left adnexal tenderness, no uterine tenderness  Rectal: deferred    A/P pending comprehensive evaluation (patient sent off for diagnostic imaging) HPI: This is a 41 year old female, HHA, who has medical history of anemia secondary to menorrhagia with irregular cycle and uterine leiomyoma status post Hysteroscopy with Dilation and curettage 16. 2 weeks ago she developed lumbar pain worsen with ambulation and bending, on 4 days ago she began to develop a rash which started on the lumbosacral area and spread to her groin, anterior thigh, abd, and upper extremity including her palms, the rash is very itchy and has associated muscular thigh pain, loss of appetite and for the past 3 day febrile (Tmax 105) which was also present on presentation. She reported to the hospital today as her lip became swollen in addition to the worsening of the noted complaints. She denies: recent illness, tampon use, multiple sexual partners, new medication, new soap or food ingestion, recent travel or hiking, smoking, alcohol or illicit drug use, wt loss, or history of vasculitis. She has a family history only significant for HTN.  Denies: headache, chest pain, diarrhea, constipation or dysuria.     Pt was seen and evaluated in ER on  at which time she was having back pain with newly developing rash on her lumbosacral and abdominal area, at that time she was discharged on Robaxin, benadryl and ibuprofen - she reported only minimal relief. She fevers began to develop on the following day.    As per OB Hx, she is a 42 yo  (4 prior C/S, 2x 1st trimester miscarriages) and a poor historian, OB is Dr. Piña.  She is s/p hysteroscopy with D&C on 16 for menometrorrhagia possibly 2/2 fibroid uterus, s/p BTL (), and has microcytic anemia, not on medication.  History is unremarkable otherwise, not on any medication.  Menarche age 15, regular cycles, LMP 18.  Coitarche age 21, 1 partner, no history of STIs.  Last papsmear , NILM, no hx of abnormal papsmears.  Denies previous Mammography.  Family hx non-contributory.    Vital Signs Last 24 Hrs  T(C): 37.2, Max: 40.7 ( @ 22:09)  T(F): 98.9, Max: 105.2 (05 @ 22:09)  HR: 96 (94 - 114)  BP: 94/62 (90/55 - 120/65)  BP(mean): --  RR: 18 (18 - 25)  SpO2: 100% (99% - 100%)    Physical Exam:  Gen: mild distress 2/2 pain and pt seems anxious  CVS: +S1S2, RRR  Chest: CTA b/l  Neck: supple, no thyroid abnormalities  Abd: mild tenderness in b/l LQs L>R, no guarding/rigidity/rebound tenderness; +BS  : external genitalia w/ diffuse maculopapular rash, non tender;  upon speculum exam, milky white discharge noted, cervix retroverted w/ mild inflammatory changes and multiple pinpoint white papules noted on ectocervix - GC/Chlamydia, Affirm vaginitis and general cervical cultures collected  Upon digital exam +CMT, +left adnexal tenderness, no uterine tenderness  Rectal: deferred  Ext: no edema, calf tenderness, or cyanosis  Psych: appropriate mood & affect      LABS:                        9.8    3.4   )-----------( 150      ( 02 May 2017 22:25 )             30.6     CBC Full  -  ( 02 May 2017 22:25 )  WBC Count : 3.4 K/uL  Hemoglobin : 9.8 g/dL  Hematocrit : 30.6 %  Platelet Count - Automated : 150 K/uL  Mean Cell Volume : 65.1 fl  Mean Cell Hemoglobin : 20.9 pg  Mean Cell Hemoglobin Concentration : 32.0 g/dL  Auto Neutrophil # : x  Auto Lymphocyte # : x  Auto Monocyte # : x  Auto Eosinophil # : x  Auto Basophil # : x  Auto Neutrophil % : 82.0 %  Auto Lymphocyte % : 15.0 %  Auto Monocyte % : 2.0 %  Auto Eosinophil % : 1.0 %  Auto Basophil % : x        141  |  108<H>  |  7.0<L>  ----------------------------<  126<H>  3.8   |  20.0<L>  |  0.55    Ca    7.7<L>      03 May 2017 05:47  Mg     2.0     -    TPro  6.8  /  Alb  3.2<L>  /  TBili  0.2<L>  /  DBili  x   /  AST  25  /  ALT  15  /  AlkPhos  53      Urinalysis Basic - ( 03 May 2017 04:35 )    Color: Yellow / Appearance: Clear / S.005 / pH: x  Gluc: x / Ketone: Negative  / Bili: Negative / Urobili: Negative mg/dL   Blood: x / Protein: Negative mg/dL / Nitrite: Negative   Leuk Esterase: Negative / RBC: x / WBC x   Sq Epi: x / Non Sq Epi: x / Bacteria: x    Procalcitonin, Serum: 0.16 ng/mL ( @ 05:47)    Albumin, Serum: 3.2 g/dL ( @ 05:47)  Albumin, Serum: 4.1 g/dL ( @ 22:25)    LIVER FUNCTIONS - ( 03 May 2017 05:47 )  Alb: 3.2 g/dL / Pro: 6.8 g/dL / ALK PHOS: 53 U/L / ALT: 15 U/L / AST: 25 U/L / GGT: x             RADIOLOGY & ADDITIONAL TESTS:  CT Abd/Pelvis FINDINGS:  Solid organ evaluation is suboptimal inherent to noncontrast CT   technique.   LUNGS: Bibasilar dependent changes are present. No pleural effusion.  LIVER/GALLBLADDER: Normal liver size. There is no intrahepatic or   extrahepatic biliary ductal dilatation. Unremarkable gallbladder.  PANCREAS: No pancreatic ductal dilatation, peripancreatic fluid   collection, or focal pancreatic mass.  SPLEEN: Normal size.  KIDNEYS: No hydronephrosis, perinephric stranding, or fluid collection.  ADRENAL GLANDS: Unremarkable.  BOWEL: Normal appendix. No bowel obstruction or free intraperitoneal air.   URINARY BLADDER: Moderately distended.  PELVIC ORGANS: Bulky heterogeneous uterus with possible underlying   fibroids is elevated by the urinary bladder.  LYMPH NODES: Bilateral inguinal adenopathy with lymph nodes measuring up   to 1.2 mm in short axis. Mild subcutaneous fat stranding in the anterior   pelvic wall may reflect colitis.  BONES/SOFT TISSUES: Images of the lumbar spine demonstrates no osseous   erosion or evidence of discitis osteomyelitis. No suspicious osseous   lesion.  Diastases of the rectus abdominis musculature.  AORTA: No aneurysmal dilatation.     IMPRESSION:    CT ABDOMEN PELVIS: Bilateral inguinal adenopathy and cellulitic changes   in the anterior pelvic soft tissues.  No hydronephrosis or urinary tract calculus.  CT LUMBAR SPINE: Unremarkable.

## 2017-05-03 NOTE — H&P ADULT - HISTORY OF PRESENT ILLNESS
This is a 41 year old female, HHA, who has medical history of anemia secondary to menorrhagia with irregular cycle and uterine leiomyoma status post Hysteroscopy with Dilation and curettage 11/29/16. 2 weeks ago she developed lumbar pain worsen with ambulation and bending, on 4 days ago she began to develop a rash which started on the lumbosacral area and spread to her groin, anterior thigh, abd, and upper extremity including her palms, the rash is very itchy and has associated muscular thigh pain, loss of appetite and for the past 3 day febrile (Tmax 105) which was also present on presentation. She reported to the hospital today as her lip became swollen in addition to the worsening of the noted complaints. She denies: recent illness, tampon use, multiple sexual partners, new medication, new soap or food ingestion, recent travel or hiking, smoking, alcohol or illicit drug use, wt loss, or history of vasculitis. She has a family history only significant for HTN.  Denies: headache, chest pain, diarrhea, constipation or dysuria.     Pt was seen and evaluated in ER on 4/28 at which time she was having back pain with newly developing rash on her lumbosacral and abdominal area, at that time she was discharged on Robaxin, benadryl and ibuprofen - she reported only minimal relief. She fevers began to develop on the following day.

## 2017-05-03 NOTE — H&P ADULT - FAMILY HISTORY
Father  Still living? Unknown  Family history of hypertension in mother, Age at diagnosis: Age Unknown     Mother  Still living? Unknown  Family history of hypertension in mother, Age at diagnosis: Age Unknown

## 2017-05-03 NOTE — CONSULT NOTE ADULT - PROBLEM SELECTOR RECOMMENDATION 2
+milky white discharge and +CMT and left adnexal tenderness  Agree w/ Zosyn, Vanco, and Doxycycline  Will follow up GC/Chlamydia, Vaginitis Affirm, and Cervical swab cultures  F/U pelvic US
No meningeal signs  MRI back with no findings  CT lumbar spine no findings

## 2017-05-03 NOTE — ED ADULT NURSE REASSESSMENT NOTE - NS ED NURSE REASSESS COMMENT FT1
Admitting MD at bedside, pt awake and alert x4, denies any pain or discomfort. pt to be admitted for further evaluation. Will continue to monitor.
As per MD orders, Valium 5mg IVP administered to complete MRI.
Pt lying comfortably in bed, denies any pain or discomfort. rash now on neck and chin. pt reports itching to neck and chin. MD made aware.
Pt reports dry and itchy mouth. lips swollen, pt denies diff breathing or SOB. MD made aware. MD orders noted and initiated. Will continue to monitor.
red bumpy rash on bilat arms, low back and pubic area. pt reports rash started on Saturday with fever and is very itchy. MD made aware.
sepsis sheet started patient moved from [facility] to [facility], patient in stable condition, antibiotics started
Pt awake and alert x4, resting comfortably in bed. pt denies chest pain or SOB. pt  reports feeling dizziness and nausea. MD informed. As per MD orders, pt medicated, will continue to monitor.
Pt reports relief of back pain. pt lying comfortably in bed, family at bedside. Transport arrived to take pt to MRI.

## 2017-05-03 NOTE — H&P ADULT - ASSESSMENT
42 Y/o  Female with fever (Tmax 105), diffuse non blanchable Rash, oral ulcers, vaginal ulcers, Cervical motion tenderness.     Admitted to medical unit with   - F/U ID consult  - IV abx Zosyn/vanco/doxy  - f/u vaginal cultures, oral cultures, blood cultures  - GYN/OB consult appreciated  - f/u pelvic Ultrasound   - F/U Lumbar MRI  - dexamethasone given in ER  - Cont IV hydration   - Acetaminophen PRN for fever

## 2017-05-04 LAB
ANION GAP SERPL CALC-SCNC: 12 MMOL/L — SIGNIFICANT CHANGE UP (ref 5–17)
B BURGDOR C6 AB SER-ACNC: NEGATIVE — SIGNIFICANT CHANGE UP
B BURGDOR IGG+IGM SER-ACNC: 0.14 INDEX — SIGNIFICANT CHANGE UP (ref 0.01–0.89)
BUN SERPL-MCNC: 7 MG/DL — LOW (ref 8–20)
C TRACH RRNA SPEC QL NAA+PROBE: SIGNIFICANT CHANGE UP
CALCIUM SERPL-MCNC: 8.3 MG/DL — LOW (ref 8.6–10.2)
CANDIDA AB TITR SER: SIGNIFICANT CHANGE UP
CHLORIDE SERPL-SCNC: 107 MMOL/L — SIGNIFICANT CHANGE UP (ref 98–107)
CO2 SERPL-SCNC: 23 MMOL/L — SIGNIFICANT CHANGE UP (ref 22–29)
CREAT SERPL-MCNC: 0.5 MG/DL — SIGNIFICANT CHANGE UP (ref 0.5–1.3)
CULTURE RESULTS: NO GROWTH — SIGNIFICANT CHANGE UP
G VAGINALIS DNA SPEC QL NAA+PROBE: DETECTED
GLUCOSE SERPL-MCNC: 100 MG/DL — SIGNIFICANT CHANGE UP (ref 70–115)
HCT VFR BLD CALC: 27.5 % — LOW (ref 37–47)
HGB BLD-MCNC: 8.3 G/DL — LOW (ref 12–16)
MCHC RBC-ENTMCNC: 19.8 PG — LOW (ref 27–31)
MCHC RBC-ENTMCNC: 30.2 G/DL — LOW (ref 32–36)
MCV RBC AUTO: 65.6 FL — LOW (ref 81–99)
N GONORRHOEA RRNA SPEC QL NAA+PROBE: SIGNIFICANT CHANGE UP
PLATELET # BLD AUTO: 140 K/UL — LOW (ref 150–400)
POTASSIUM SERPL-MCNC: 3.6 MMOL/L — SIGNIFICANT CHANGE UP (ref 3.5–5.3)
POTASSIUM SERPL-SCNC: 3.6 MMOL/L — SIGNIFICANT CHANGE UP (ref 3.5–5.3)
RBC # BLD: 4.19 M/UL — LOW (ref 4.4–5.2)
RBC # FLD: 19.6 % — HIGH (ref 11–15.6)
SODIUM SERPL-SCNC: 142 MMOL/L — SIGNIFICANT CHANGE UP (ref 135–145)
SPECIMEN SOURCE: SIGNIFICANT CHANGE UP
SPECIMEN SOURCE: SIGNIFICANT CHANGE UP
T VAGINALIS SPEC QL WET PREP: SIGNIFICANT CHANGE UP
T4 FREE SERPL-MCNC: 0.8 NG/DL — LOW (ref 0.9–1.8)
VANCOMYCIN TROUGH SERPL-MCNC: 37.8 UG/ML — CRITICAL HIGH (ref 10–20)
WBC # BLD: 3.9 K/UL — LOW (ref 4.8–10.8)
WBC # FLD AUTO: 3.9 K/UL — LOW (ref 4.8–10.8)

## 2017-05-04 PROCEDURE — 99233 SBSQ HOSP IP/OBS HIGH 50: CPT

## 2017-05-04 PROCEDURE — 99222 1ST HOSP IP/OBS MODERATE 55: CPT

## 2017-05-04 RX ORDER — PETROLATUM,WHITE
1 JELLY (GRAM) TOPICAL
Qty: 0 | Refills: 0 | Status: DISCONTINUED | OUTPATIENT
Start: 2017-05-04 | End: 2017-05-06

## 2017-05-04 RX ORDER — FLUOCINONIDE/EMOLLIENT BASE 0.05 %
1 CREAM (GRAM) TOPICAL
Qty: 0 | Refills: 0 | Status: DISCONTINUED | OUTPATIENT
Start: 2017-05-04 | End: 2017-05-06

## 2017-05-04 RX ADMIN — PIPERACILLIN AND TAZOBACTAM 25 GRAM(S): 4; .5 INJECTION, POWDER, LYOPHILIZED, FOR SOLUTION INTRAVENOUS at 21:04

## 2017-05-04 RX ADMIN — PIPERACILLIN AND TAZOBACTAM 25 GRAM(S): 4; .5 INJECTION, POWDER, LYOPHILIZED, FOR SOLUTION INTRAVENOUS at 13:07

## 2017-05-04 RX ADMIN — Medication 1 APPLICATION(S): at 17:52

## 2017-05-04 RX ADMIN — SODIUM CHLORIDE 125 MILLILITER(S): 9 INJECTION INTRAMUSCULAR; INTRAVENOUS; SUBCUTANEOUS at 00:39

## 2017-05-04 RX ADMIN — PIPERACILLIN AND TAZOBACTAM 25 GRAM(S): 4; .5 INJECTION, POWDER, LYOPHILIZED, FOR SOLUTION INTRAVENOUS at 00:37

## 2017-05-04 RX ADMIN — Medication 1 APPLICATION(S): at 17:50

## 2017-05-04 RX ADMIN — Medication 500 MILLIGRAM(S): at 13:07

## 2017-05-04 RX ADMIN — Medication 325 MILLIGRAM(S): at 13:07

## 2017-05-04 NOTE — CONSULT NOTE ADULT - CONSULT REASON
concern for pelvic infection - full note to follow pending comprehensive evaluation (patient sent off for diagnostic imaging)
Fever and rash
Rash and fever

## 2017-05-04 NOTE — PROGRESS NOTE ADULT - SUBJECTIVE AND OBJECTIVE BOX
chief complaint of BIBA with reports of fever 105.3 at home, pain to mid bilat back. 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 2 weeks ago after D and C, she developed lumbar pain worsen with ambulation and bending, on 4 days ago she began to develop a rash which started on the lumbosacral area and spread to her groin, anterior thigh, abd, and upper extremity including her palms. The rash is very itchy and has associated muscular thigh pain, loss of appetite and for the past 3 day febrile (Tmax 105) which was also present on presentation. She reported to the hospital today as her lip became swollen in addition to the worsening of the noted complaints. She denies: recent illness, tampon use, multiple sexual partners, new medication, new soap or food ingestion, recent travel or hiking, smoking, alcohol or illicit drug use, wt loss, or history of vasculitis. She has a family history only significant for HTN.  Denies: headache, chest pain, diarrhea, constipation or dysuria.     Pt was seen and evaluated in ER on  at which time she was having back pain with newly developing rash on her lumbosacral and abdominal area, at that time she was discharged on Robaxin, benadryl and ibuprofen - she reported only minimal relief. She fevers began to develop on the following day. (03 May 2017 06:18)    Today:  Pt notes that she feels the same today. Her throat is sore but is requesting to eat.  Notes that lip swelling is better. Notes rash is still itchy especially on the palms of her hands.   Denies vaginal discharge. Pts mentral cycle started yesterday.     ROS:   no fevers  no chills  no chest pain   no sob    PAST MEDICAL & SURGICAL HISTORY:  Anemia, unspecified type  Back pain  No pertinent past medical history  S/P dilation and curettage  S/P tubal ligation  S/P  section: x 4  No significant past surgical history    MEDICATIONS  (STANDING):  vancomycin  IVPB 1000milliGRAM(s) IV Intermittent every 8 hours  piperacillin/tazobactam IVPB. 3.375Gram(s) IV Intermittent every 8 hours  sodium chloride 0.9%. 1000milliLiter(s) IV Continuous <Continuous>  ferrous    sulfate 325milliGRAM(s) Oral daily  ascorbic acid 500milliGRAM(s) Oral daily    MEDICATIONS  (PRN):  acetaminophen   Tablet 650milliGRAM(s) Oral every 6 hours PRN For Temp greater than 38 C (100.4 F)  acetaminophen   Tablet. 650milliGRAM(s) Oral every 6 hours PRN Mild Pain (1 - 3)    LABS:  Urinalysis Basic - ( 03 May 2017 04:35 )    Color: Yellow / Appearance: Clear / S.005 / pH: x  Gluc: x / Ketone: Negative  / Bili: Negative / Urobili: Negative mg/dL   Blood: x / Protein: Negative mg/dL / Nitrite: Negative   Leuk Esterase: Negative / RBC: x / WBC x   Sq Epi: x / Non Sq Epi: x / Bacteria: x    PT/INR - ( 03 May 2017 11:36 )   PT: 12.5 sec;   INR: 1.13 ratio    PTT - ( 03 May 2017 11:36 )  PTT:26.0 sec  LIVER FUNCTIONS - ( 03 May 2017 05:47 )  Alb: 3.2 g/dL / Pro: 6.8 g/dL / ALK PHOS: 53 U/L / ALT: 15 U/L / AST: 25 U/L / GGT: x                            8.3    3.9   )-----------( 140      ( 04 May 2017 07:01 )             27.5     142  |  107  |  7.0<L>  ----------------------------<  100  3.6   |  23.0  |  0.50  Ca    8.3<L>      04 May 2017 07:01  Mg     2.0     -  TPro  6.8  /  Alb  3.2<L>  /  TBili  0.2<L>  /  DBili  x   /  AST  25  /  ALT  15  /  AlkPhos  53  03  CARDIAC MARKERS ( 03 May 2017 05:47 )  x     / x     / 107 U/L / x     / x        PHYSICAL EXAM:  Vital Signs Last 24 Hrs  T(C): 37.3, Max: 37.7 ( @ 22:18)  T(F): 99.2, Max: 99.8 (05-03 @ 22:18)  HR: 79 (73 - 88)  BP: 121/80 (105/72 - 121/80)  BP(mean): --  RR: 16 (16 - 18)  SpO2: 100% (94% - 100%)    GENERAL NAD, ALERT LAYING IN BED/CHAIR  HEENT NORMAL CEPHALIC ATRAUMATIC, EOMI, PERRLA, +oral ulcers/ white in color in oropharynx.   CVS: S1,S2,RRR, NO MURMUR, NO RUBS, NO GALLOPS  RESP: BL CTA, NO WHEEZING, NO RHONCHI, NO CRACKLES, NO LABORED BREATHING  ABD: SOFT, NON TENDER, NON DISTENDED  SKIN: WARM, DRY, INTACT  NEURO AOX3  MUSCULOSKELETAL: ROM INTACT IN ALL 4 EXTREMITIES  RECTAL DEFFERED  PSYCH APPROPRIATE

## 2017-05-04 NOTE — PROGRESS NOTE ADULT - PROBLEM SELECTOR PLAN 2
Continue current mgmt Continue current mgmt as per Primary team.  Agree w/ Infectious disease and dermatology recommendations.  Will monitor pelvic region clinically.

## 2017-05-04 NOTE — PROGRESS NOTE ADULT - ASSESSMENT
42 Y/o  Female with fever (Tmax 105), diffuse non blanchable Rash, oral ulcers, vaginal ulcers, +ve Cervical motion tenderness.     - R/o PID vs infectious source secondary to D and C 2 weeks ago.   GC cultures pending. results possibly available today.  F/U ID consult  - IV abx Zosyn/vanco  - f/u vaginal cultures, oral cultures, blood cultures  - GYN/OB consult appreciated  - Cont IV hydration   - Acetaminophen PRN for fever    -Rash  erythematous macular papular rash  possibly secondary to infectious cause  derm consulted

## 2017-05-04 NOTE — PROGRESS NOTE ADULT - SUBJECTIVE AND OBJECTIVE BOX
40yo , LMP today, regular w/ recent hysteroscopy and D&C for menometrorrhagia, NILM papsmears, and monogamous w/ one partner, no history of STIs, who presented with fever, rash and back pain and was admitted on 5/3 for sepsis possibly 2/2 pelvic cellulitis vs. unspecified infectious process.     Subjective:  The patient feels well overall, stating no fevers, chills, or malaise as yesterday.  She states her rash has improved but still notes itchiness in palms, back, pelvic and thigh regions.  She denies any vaginal bleeding or discharge.  She is ambulating, tolerating regular diet.  She denies nausea and vomiting.  She is voiding.  Although she notes lower back pain with no overall improvement, her pain is controlled with med Rx.  No other complaints.      Vital Signs Last 24 Hrs  T(C): 37.3, Max: 37.7 (05-03 @ 22:18)  T(F): 99.2, Max: 99.8 (05-03 @ 22:18)  HR: 79 (73 - 88)  BP: 121/80 (105/72 - 121/80)  BP(mean): --  RR: 16 (16 - 18)  SpO2: 100% (94% - 100%)    Gen: NAD, AAO x3  HEENT: unremarkable  CVS: +S1S2, RRR, no MRG  Chest: CTA b/l, no w/r/r  Neck: supple, no thyroid abnormalities, no LNs appreciated  Abd: mild tenderness in b/l LQs L>R, no guarding/rigidity/rebound tenderness; +BS  Back: no CVAT  : deferred  Rectal: deferred  Ext: no edema, calf tenderness, or cyanosis  CNS: grossly non focal  Skin: maculo-papular rash on abdomen, suprapubic region, medial anterior thighs b/l, flanks, radial side of palms of hands b/l  Psych: appropriate mood & affect      I & Os for current day (as of 04 May 2017 10:54)  =============================================  IN:    sodium chloride 0.9%.: 1625 ml    Solution: 250 ml    Oral Fluid: 240 ml    Solution: 100 ml    Total IN: 2215 ml  ---------------------------------------------  OUT:    Total OUT: 0 ml  ---------------------------------------------  Total NET: 2215 ml      LABS:                        8.3    3.9   )-----------( 140      ( 04 May 2017 07:01 )             27.5     CBC Full  -  ( 04 May 2017 07:01 )  WBC Count : 3.9 K/uL  Hemoglobin : 8.3 g/dL  Hematocrit : 27.5 %  Platelet Count - Automated : 140 K/uL  Mean Cell Volume : 65.6 fl  Mean Cell Hemoglobin : 19.8 pg  Mean Cell Hemoglobin Concentration : 30.2 g/dL  Auto Neutrophil # : x  Auto Lymphocyte # : x  Auto Monocyte # : x  Auto Eosinophil # : x  Auto Basophil # : x  Auto Neutrophil % : x  Auto Lymphocyte % : x  Auto Monocyte % : x  Auto Eosinophil % : x  Auto Basophil % : x    05-04    142  |  107  |  7.0<L>  ----------------------------<  100  3.6   |  23.0  |  0.50    Ca    8.3<L>      04 May 2017 07:01  Mg     2.0     05-02    TPro  6.8  /  Alb  3.2<L>  /  TBili  0.2<L>  /  DBili  x   /  AST  25  /  ALT  15  /  AlkPhos  53  05-03    PT/INR - ( 03 May 2017 11:36 )   PT: 12.5 sec;   INR: 1.13 ratio         PTT - ( 03 May 2017 11:36 )  PTT:26.0 sec  Urinalysis Basic - ( 03 May 2017 04:35 )    Color: Yellow / Appearance: Clear / S.005 / pH: x  Gluc: x / Ketone: Negative  / Bili: Negative / Urobili: Negative mg/dL   Blood: x / Protein: Negative mg/dL / Nitrite: Negative   Leuk Esterase: Negative / RBC: x / WBC x   Sq Epi: x / Non Sq Epi: x / Bacteria: x        Culture Results:   No Beta Strep group A isolated  Culture in progress ( @ 08:58)  Culture Results:   Few Candida albicans  Moderate Routine vaginal fabiola  Culture in progress ( @ 07:08)  Culture Results:   No growth ( @ 04:36)    Procalcitonin, Serum: 0.16 ng/mL ( @ 05:47)      D-Dimer Assay, Quantitative: 2934 ng/mL DDU ( @ 11:36)    LIVER FUNCTIONS - ( 03 May 2017 05:47 )  Alb: 3.2 g/dL / Pro: 6.8 g/dL / ALK PHOS: 53 U/L / ALT: 15 U/L / AST: 25 U/L / GGT: x             RADIOLOGY & ADDITIONAL TESTS: 40yo , LMP today, regular w/ recent hysteroscopy and D&C for menometrorrhagia, NILM papsmears, and monogamous w/ one partner, no history of STIs, who presented with fever, rash and back pain and was admitted on 5/3 for sepsis possibly 2/2 pelvic cellulitis vs. unspecified infectious process.     Subjective:  The patient feels well overall, stating no fevers, chills, or malaise as yesterday.  She states her rash has improved but still notes itchiness in palms, back, pelvic and thigh regions.  She denies any vaginal bleeding or discharge.  She is ambulating, tolerating regular diet.  She denies nausea and vomiting.  She is voiding.  Although she notes lower back pain with no overall improvement, her pain is controlled with med Rx.  No other complaints.      Vital Signs Last 24 Hrs  T(C): 37.3, Max: 37.7 (05-03 @ 22:18)  T(F): 99.2, Max: 99.8 (05-03 @ 22:18)  HR: 79 (73 - 88)  BP: 121/80 (105/72 - 121/80)  BP(mean): --  RR: 16 (16 - 18)  SpO2: 100% (94% - 100%)    Gen: NAD, AAO x3  HEENT: unremarkable  CVS: +S1S2, RRR, no MRG  Chest: CTA b/l, no w/r/r  Neck: supple, no thyroid abnormalities, no LNs appreciated  Abd: mild tenderness in b/l LQs L>R, no guarding/rigidity/rebound tenderness; +BS  Back: no CVAT  : deferred  Rectal: deferred  Ext: no edema, calf tenderness, or cyanosis  CNS: grossly non focal  Skin: maculo-papular rash on abdomen, suprapubic region, medial anterior thighs b/l, flanks, radial side of palms of hands b/l  Psych: appropriate mood & affect      I & Os for current day (as of 04 May 2017 10:54)  =============================================  IN:    sodium chloride 0.9%.: 1625 ml    Solution: 250 ml    Oral Fluid: 240 ml    Solution: 100 ml    Total IN: 2215 ml  ---------------------------------------------  OUT:    Total OUT: 0 ml  ---------------------------------------------  Total NET: 2215 ml      LABS:                        8.3    3.9   )-----------( 140      ( 04 May 2017 07:01 )             27.5     CBC Full  -  ( 04 May 2017 07:01 )  WBC Count : 3.9 K/uL  Hemoglobin : 8.3 g/dL  Hematocrit : 27.5 %  Platelet Count - Automated : 140 K/uL  Mean Cell Volume : 65.6 fl  Mean Cell Hemoglobin : 19.8 pg  Mean Cell Hemoglobin Concentration : 30.2 g/dL  Auto Neutrophil # : x  Auto Lymphocyte # : x  Auto Monocyte # : x  Auto Eosinophil # : x  Auto Basophil # : x  Auto Neutrophil % : x  Auto Lymphocyte % : x  Auto Monocyte % : x  Auto Eosinophil % : x  Auto Basophil % : x    05-04    142  |  107  |  7.0<L>  ----------------------------<  100  3.6   |  23.0  |  0.50    Ca    8.3<L>      04 May 2017 07:01  Mg     2.0     05-02    TPro  6.8  /  Alb  3.2<L>  /  TBili  0.2<L>  /  DBili  x   /  AST  25  /  ALT  15  /  AlkPhos  53  05-03    PT/INR - ( 03 May 2017 11:36 )   PT: 12.5 sec;   INR: 1.13 ratio         PTT - ( 03 May 2017 11:36 )  PTT:26.0 sec  Urinalysis Basic - ( 03 May 2017 04:35 )    Color: Yellow / Appearance: Clear / S.005 / pH: x  Gluc: x / Ketone: Negative  / Bili: Negative / Urobili: Negative mg/dL   Blood: x / Protein: Negative mg/dL / Nitrite: Negative   Leuk Esterase: Negative / RBC: x / WBC x   Sq Epi: x / Non Sq Epi: x / Bacteria: x        Culture Results:   No Beta Strep group A isolated  Culture in progress ( @ 08:58)  Culture Results:   Few Candida albicans  Moderate Routine vaginal fabiola  Culture in progress ( @ 07:08)  Culture Results:   No growth ( @ 04:36)    Procalcitonin, Serum: 0.16 ng/mL ( @ 05:47)      D-Dimer Assay, Quantitative: 2934 ng/mL DDU ( @ 11:36)    LIVER FUNCTIONS - ( 03 May 2017 05:47 )  Alb: 3.2 g/dL / Pro: 6.8 g/dL / ALK PHOS: 53 U/L / ALT: 15 U/L / AST: 25 U/L / GGT: x             RADIOLOGY & ADDITIONAL TESTS:  US Pelvis FINDINGS:  Uterus: 13.6 x 6.2 x 7.2 cm. heterogeneous and globular with poorly   defined zonal anatomy.  Endometrium: 7 mm. Poorly delineated transabdominally.  Right ovary: 2.1 x 1.2 x 2.6 cm. Within normal limits.  Left ovary: 2.8 x 1.4 x 2.6 cm. Within normal limits.  Free fluid: None.    IMPRESSION:  Enlarged heterogeneous uterus which may reflect adenomyosis. Evaluation   of the endometrium and adnexa was limited due to patient's refusal of   endovaginal ultrasound, however no gross adnexal mass.

## 2017-05-04 NOTE — PROGRESS NOTE ADULT - ASSESSMENT
A/P Pending discussion with Attending.  For now continue current mgmt as per primary team, and our recommendations.  Consults appreciated. 40 yo admitted w/ sepsis 2/2 possible drug reaction vs. anterior pelvis cellulitis, and concern for PID.  Labs & Charts reviewed; consults appreciated.

## 2017-05-04 NOTE — CONSULT NOTE ADULT - ASSESSMENT
40 yo , LMP 4/4, presented with fever and severe lower back pain, code sepsis, given IV Hydration and started empirically on Zosyn, Vancomycin, and Doxycycline, concerning for cellulitis vs. SJS vs. PID.
Erythematous patchy eruption most consistent with a reaction to medication. A viral syndrome is also possible. Early Millan-Angel syndrome is least likely--it would usually be much more severe and totally erosive orally and on labial mucosa.  The examthem is non-specific and biopsy is not going to be helpful at this time.  However I will follow her closely and reconsider if this should progress.  I would avoid ibuprofen at this time and I am prescribing topical therapy for local care.
42 y/o F with diffuse rash, Fever, and back pain

## 2017-05-04 NOTE — PROGRESS NOTE ADULT - PROBLEM SELECTOR PLAN 1
Continue current mgmt Continue current mgmt as per Primary team.  Agree w/ Infectious disease and dermatology recommendations

## 2017-05-04 NOTE — PROGRESS NOTE ADULT - PROBLEM SELECTOR PLAN 1
Blood cultures pending  No fevers since  Patient had CT Chest/Abdomen/ Pelvis with anterior pelvic cellulitis  Patient has D&C 2 weeks back  Continue Vancomycin and Zosyn for now  Follow up GC/Chlam  UA with no UTI Blood cultures no growth  No fevers since  Patient had CT Chest/Abdomen/ Pelvis with anterior pelvic cellulitis  Patient has D&C 2 weeks back  Will D/C Vancomycin and Zosyn   GC/Chlam negative  UA with no UTI  Monitor off antibiotics

## 2017-05-04 NOTE — PROGRESS NOTE ADULT - SUBJECTIVE AND OBJECTIVE BOX
[facility]  INFECTIOUS DISEASES AND INTERNAL MEDICINE OF [place]  =======================================================  Cameron Hoffman MD  Diplomates American Board of Internal Medicine and Infectious Diseases  =======================================================    SHAWNA ZARAGOZA     Follow up Rash    Reports improvement  No fevers    Allergies:  No Known Allergies      Antibiotics:  vancomycin  IVPB 1000milliGRAM(s) IV Intermittent every 8 hours  piperacillin/tazobactam IVPB. 3.375Gram(s) IV Intermittent every 8 hours      REVIEW OF SYSTEMS:  CONSTITUTIONAL:  + Fever  HEENT:  No diplopia or blurred vision. No earache, sore throat or runny nose.  CARDIOVASCULAR:  No pressure, squeezing, strangling, tightness, heaviness or aching about the chest, neck, axilla or epigastrium.  RESPIRATORY:  No cough, shortness of breath  GASTROINTESTINAL:  No nausea, vomiting or diarrhea.  GENITOURINARY:  No dysuria, frequency or urgency.  MUSCULOSKELETAL:  no joint aches, no muscle pain  SKIN:  No change in skin, hair or nails.  NEUROLOGIC:  No paresthesias, fasciculations, seizures or weakness.  PSYCHIATRIC:  No disorder of thought or mood.  ENDOCRINE:  No heat or cold intolerance, polyuria or polydipsia.  HEMATOLOGICAL:  No easy bruising or bleeding.        Physical Exam:  Vital Signs Last 24 Hrs  T(C): 37.3, Max: 37.7 (05-03 @ 22:18)  T(F): 99.2, Max: 99.8 (05-03 @ 22:18)  HR: 79 (73 - 83)  BP: 121/80 (105/72 - 121/80)  RR: 16 (16 - 17)  SpO2: 100% (94% - 100%)    GEN: NAD, pleasant  HEENT: normocephalic and atraumatic. EOMI. PERRL.    NECK: Supple.   LUNGS: Clear to auscultation.  HEART: Regular rate and rhythm without murmur.  ABDOMEN: Soft, nontender, and nondistended.  Positive bowel sounds.    : No CVA tenderness  MSK: No joint swelling  EXTREMITIES: Without any cyanosis, clubbing, lesions or edema.  NEUROLOGIC: Cranial nerves II through XII are grossly intact.  PSYCHIATRIC: Appropriate affect .  SKIN: Diffuse rash        Labs:      142  |  107  |  7.0<L>  ----------------------------<  100  3.6   |  23.0  |  0.50    Ca    8.3<L>      04 May 2017 07:01  Mg     2.0         TPro  6.8  /  Alb  3.2<L>  /  TBili  0.2<L>  /  DBili  x   /  AST  25  /  ALT  15  /  AlkPhos  53                            8.3    3.9   )-----------( 140      ( 04 May 2017 07:01 )             27.5       PT/INR - ( 03 May 2017 11:36 )   PT: 12.5 sec;   INR: 1.13 ratio         PTT - ( 03 May 2017 11:36 )  PTT:26.0 sec  Urinalysis Basic - ( 03 May 2017 04:35 )    Color: Yellow / Appearance: Clear / S.005 / pH: x  Gluc: x / Ketone: Negative  / Bili: Negative / Urobili: Negative mg/dL   Blood: x / Protein: Negative mg/dL / Nitrite: Negative   Leuk Esterase: Negative / RBC: x / WBC x   Sq Epi: x / Non Sq Epi: x / Bacteria: x      LIVER FUNCTIONS - ( 03 May 2017 05:47 )  Alb: 3.2 g/dL / Pro: 6.8 g/dL / ALK PHOS: 53 U/L / ALT: 15 U/L / AST: 25 U/L / GGT: x           CARDIAC MARKERS ( 03 May 2017 05:47 )  x     / x     / 107 U/L / x     / x          RECENT CULTURES:   .Throat Throat XXXX XXXX   No Beta Strep group A isolated  Culture in progress     .Genital Cervical Swab XXXX XXXX   Few Candida albicans  Moderate Routine vaginal fabiola  Culture in progress     .Urine Clean Catch (Midstream) XXXX XXXX   No growth

## 2017-05-04 NOTE — PROGRESS NOTE ADULT - PROBLEM SELECTOR PLAN 4
Continue current mgmt Clinically suggestive of PID as +CMT and left adnexal tenderness, however, cultures negative to date except +Gardnerella.  For now continue current management as above and we will monitor clinically

## 2017-05-04 NOTE — PROGRESS NOTE ADULT - PROBLEM SELECTOR PLAN 3
Continue current mgmt Possibly 2/2 adenomyosis but patient denies menorrhagia or dysmenorrhea at the moment.  Will monitor clinically

## 2017-05-04 NOTE — CONSULT NOTE ADULT - SUBJECTIVE AND OBJECTIVE BOX
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 2 weeks ago she developed lumbar pain worsen with ambulation and bending, on 4 days ago she began to develop a rash which started on the lumbosacral area and spread to her groin, anterior thigh, abd, and upper extremity including her palms, the rash is very itchy and has associated muscular thigh pain, loss of appetite and for the past 3 day febrile (Tmax 105) which was also present on presentation. She reported to the hospital on May 3rd as her lip became swollen in addition to the worsening of the noted complaints. She denies: recent illness, tampon use, multiple sexual partners, new medication, new soap or food ingestion, recent travel or hiking, smoking, alcohol or illicit drug use, wt loss, or history of vasculitis. She has a family history only significant for HTN.  Denies: headache, chest pain, diarrhea, constipation or dysuria.     Pt was seen and evaluated in ER on  at which time she was having back pain with newly developing rash on her lumbosacral and abdominal area, at that time she was discharged on Robaxin, benadryl and ibuprofen - she reported only minimal relief. She fevers began to develop on the following day. (03 May 2017 06:18)    The back pain began about two weeks ago and for that she took ibuprofen, which she has taken many times in the past without problems.        PAST MEDICAL & SURGICAL HISTORY:  Anemia, unspecified type  Back pain  No pertinent past medical history  S/P dilation and curettage  S/P tubal ligation  S/P  section: x 4  No significant past surgical history      REVIEW OF SYSTEMS:    CONSTITUTIONAL:fever, fatigue  EYES: No eye pain or discharge  ENMT:  She has had throat pain and some painful swallowing  NECK: No pain or stiffness  RESPIRATORY: No cough, wheezing, chills or hemoptysis; No shortness of breath  CARDIOVASCULAR: No chest pain  GASTROINTESTINAL: No abdominal or epigastric pain. No nausea, vomiting, or hematemesis; No diarrhea. No melena or hematochezia.  GENITOURINARY: No dysuria  MUSCULOSKELETAL: No joint pain or swelling;             MEDICATIONS  (STANDING):  vancomycin  IVPB 1000milliGRAM(s) IV Intermittent every 8 hours  piperacillin/tazobactam IVPB. 3.375Gram(s) IV Intermittent every 8 hours  ferrous    sulfate 325milliGRAM(s) Oral daily  ascorbic acid 500milliGRAM(s) Oral daily    MEDICATIONS  (PRN):  acetaminophen   Tablet 650milliGRAM(s) Oral every 6 hours PRN For Temp greater than 38 C (100.4 F)  acetaminophen   Tablet. 650milliGRAM(s) Oral every 6 hours PRN Mild Pain (1 - 3)      Allergies    No Known Allergies            SOCIAL HISTORY: home health aide    FAMILY HISTORY:  Family history of hypertension in mother (Father, Mother): and father  No pertinent family history in first degree relatives      Vital Signs Last 24 Hrs  T(C): 37.3, Max: 37.7 (- @ 22:18)  T(F): 99.2, Max: 99.8 (- @ 22:18)  HR: 79 (73 - 83)  BP: 121/80 (105/72 - 121/80)  BP(mean): --  RR: 16 (16 - 17)  SpO2: 100% (94% - 100%)    PHYSICAL EXAM:    GENERAL: NAD, well-groomed, well-developed  HEAD:  Atraumatic, Normocephalic  EYES: EOMI, PERRLA, conjunctiva and sclera clear--pale conjunctiva (sickle cell trait and iron deficiency)  ENMT: Edema, tenderness with slight erosive changes of lips, Moist mucous membranes, Good dentition, No lesions  NECK: Supple, no lymphadenopathy  NERVOUS SYSTEM:  Alert & Oriented X3  ABDOMEN: Soft, Nontender, Nondistended    SKIN EXAM:  erythematous macular eruption consisting of tiny red macules, coalescent into patches on both palms, lower abdomen with erythematous desquamating patch in the lumbosacral area with erythematous papules in intergluteal fold.  Significant tenderness of the vulvar mucosa, especially on the inner aspect of the labia majora as well as the labia minora and introitus.  No true erosions or vesicles.  No sign of herpes simplex.  NIKOLSKY SIGN NEGATIVE.        LABS:                        8.3    3.9   )-----------( 140      ( 04 May 2017 07:01 )             27.5     05-04    142  |  107  |  7.0<L>  ----------------------------<  100  3.6   |  23.0  |  0.50    Ca    8.3<L>      04 May 2017 07:01  Mg     2.0         TPro  6.8  /  Alb  3.2<L>  /  TBili  0.2<L>  /  DBili  x   /  AST  25  /  ALT  15  /  AlkPhos  53      PT/INR - ( 03 May 2017 11:36 )   PT: 12.5 sec;   INR: 1.13 ratio      HIV negative as well as RPR negative  PTT - ( 03 May 2017 11:36 )  PTT:26.0 sec  Urinalysis Basic - ( 03 May 2017 04:35 )    Color: Yellow / Appearance: Clear / S.005 / pH: x  Gluc: x / Ketone: Negative  / Bili: Negative / Urobili: Negative mg/dL   Blood: x / Protein: Negative mg/dL / Nitrite: Negative   Leuk Esterase: Negative / RBC: x / WBC x   Sq Epi: x / Non Sq Epi: x / Bacteria: x      Sedimentation Rate, Erythrocyte: 10 mm/hr ( @ 05:47)    RADIOLOGY & ADDITIONAL STUDIES:CT abdomen/pelvis: bilateral inguinal adenopathy and cellulitis of abdominal wall  throat cx, urine cx negative

## 2017-05-05 LAB
A PHAGOCYTOPH IGG TITR SER IF: SIGNIFICANT CHANGE UP
A PHAGOCYTOPH IGG TITR SER IF: SIGNIFICANT CHANGE UP
A PHAGOCYTOPH IGM TITR SER IF: SIGNIFICANT CHANGE UP
ANION GAP SERPL CALC-SCNC: 8 MMOL/L — SIGNIFICANT CHANGE UP (ref 5–17)
B MICROTI DNA BLD QL NAA+PROBE: NEGATIVE — SIGNIFICANT CHANGE UP
BABESIA DNA SPEC QL NAA+PROBE: NEGATIVE — SIGNIFICANT CHANGE UP
BABESIA DNA SPEC QL NAA+PROBE: NEGATIVE — SIGNIFICANT CHANGE UP
BUN SERPL-MCNC: 7 MG/DL — LOW (ref 8–20)
CALCIUM SERPL-MCNC: 8.3 MG/DL — LOW (ref 8.6–10.2)
CHLORIDE SERPL-SCNC: 104 MMOL/L — SIGNIFICANT CHANGE UP (ref 98–107)
CO2 SERPL-SCNC: 26 MMOL/L — SIGNIFICANT CHANGE UP (ref 22–29)
CREAT SERPL-MCNC: 0.54 MG/DL — SIGNIFICANT CHANGE UP (ref 0.5–1.3)
CULTURE RESULTS: SIGNIFICANT CHANGE UP
E CHAFFEENSIS IGG TITR SER: SIGNIFICANT CHANGE UP
E CHAFFEENSIS IGM TITR SER IF: SIGNIFICANT CHANGE UP
EHRLICHIA AB TITR SER: SIGNIFICANT CHANGE UP
FERRITIN SERPL-MCNC: 130.6 NG/ML — SIGNIFICANT CHANGE UP (ref 11–306)
GLUCOSE SERPL-MCNC: 95 MG/DL — SIGNIFICANT CHANGE UP (ref 70–115)
HCT VFR BLD CALC: 27 % — LOW (ref 37–47)
HGB BLD-MCNC: 8.4 G/DL — LOW (ref 12–16)
IRON SATN MFR SERPL: 28 % — SIGNIFICANT CHANGE UP (ref 14–50)
IRON SATN MFR SERPL: 97 UG/DL — SIGNIFICANT CHANGE UP (ref 37–145)
MCHC RBC-ENTMCNC: 20.4 PG — LOW (ref 27–31)
MCHC RBC-ENTMCNC: 31.1 G/DL — LOW (ref 32–36)
MCV RBC AUTO: 65.5 FL — LOW (ref 81–99)
PLATELET # BLD AUTO: 120 K/UL — LOW (ref 150–400)
POTASSIUM SERPL-MCNC: 3.4 MMOL/L — LOW (ref 3.5–5.3)
POTASSIUM SERPL-SCNC: 3.4 MMOL/L — LOW (ref 3.5–5.3)
RBC # BLD: 4.12 M/UL — LOW (ref 4.4–5.2)
RBC # FLD: 19.3 % — HIGH (ref 11–15.6)
SODIUM SERPL-SCNC: 138 MMOL/L — SIGNIFICANT CHANGE UP (ref 135–145)
SPECIMEN SOURCE: SIGNIFICANT CHANGE UP
SPECIMEN SOURCE: SIGNIFICANT CHANGE UP
TIBC SERPL-MCNC: 349 UG/DL — SIGNIFICANT CHANGE UP (ref 220–430)
TRANSFERRIN SERPL-MCNC: 244 MG/DL — SIGNIFICANT CHANGE UP (ref 192–382)
VANCOMYCIN TROUGH SERPL-MCNC: <4 UG/ML — LOW (ref 10–20)
WBC # BLD: 4.5 K/UL — LOW (ref 4.8–10.8)
WBC # FLD AUTO: 4.5 K/UL — LOW (ref 4.8–10.8)

## 2017-05-05 PROCEDURE — 99233 SBSQ HOSP IP/OBS HIGH 50: CPT

## 2017-05-05 RX ADMIN — Medication 325 MILLIGRAM(S): at 08:35

## 2017-05-05 RX ADMIN — Medication 1 APPLICATION(S): at 17:37

## 2017-05-05 RX ADMIN — Medication 250 MILLIGRAM(S): at 06:12

## 2017-05-05 RX ADMIN — Medication 250 MILLIGRAM(S): at 21:45

## 2017-05-05 RX ADMIN — Medication 500 MILLIGRAM(S): at 08:35

## 2017-05-05 RX ADMIN — Medication 1 APPLICATION(S): at 06:13

## 2017-05-05 RX ADMIN — PIPERACILLIN AND TAZOBACTAM 25 GRAM(S): 4; .5 INJECTION, POWDER, LYOPHILIZED, FOR SOLUTION INTRAVENOUS at 08:33

## 2017-05-05 RX ADMIN — PIPERACILLIN AND TAZOBACTAM 25 GRAM(S): 4; .5 INJECTION, POWDER, LYOPHILIZED, FOR SOLUTION INTRAVENOUS at 17:37

## 2017-05-05 RX ADMIN — Medication 1 APPLICATION(S): at 12:40

## 2017-05-05 RX ADMIN — Medication 250 MILLIGRAM(S): at 13:37

## 2017-05-05 NOTE — PROGRESS NOTE ADULT - PROBLEM SELECTOR PLAN 3
Possibly 2/2 adenomyosis and now w/ menorrhagia.  Continue PO Iron supplementation.  Will monitor clinically Possibly 2/2 adenomyosis and now w/ menorrhagia.  Continue PO Iron supplementation.  Will monitor clinically, as she is not hemodynamically stable at the moment. Possibly 2/2 adenomyosis and now w/ menorrhagia.  Continue PO Iron supplementation.  Will monitor clinically, as she is hemodynamically stable at the moment.

## 2017-05-05 NOTE — PROGRESS NOTE ADULT - PROBLEM SELECTOR PLAN 1
Continue current mgmt as per Primary team.  Agree w/ Infectious disease and dermatology recommendations

## 2017-05-05 NOTE — PROGRESS NOTE ADULT - PROBLEM SELECTOR PLAN 2
Continue current mgmt as per Primary team.  Agree w/ Infectious disease and dermatology recommendations.  Will monitor pelvic region clinically.

## 2017-05-05 NOTE — PROGRESS NOTE ADULT - SUBJECTIVE AND OBJECTIVE BOX
SHAWNA ZARAGOZA MRN-[id number]    Patient is a 41y old  Female who presents with a chief complaint of BIBA with reports of fever 105.3 at home, pain to mid bilat back. 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 2 weeks ago she developed lumbar pain worsen with ambulation and bending, on 4 days ago she began to develop a rash which started on the lumbosacral area and spread to her groin, anterior thigh, abd, and upper extremity including her palms, the rash is very itchy and has associated muscular thigh pain, loss of appetite and for the past 3 day febrile (Tmax 105) which was also present on presentation. She reported to the hospital today as her lip became swollen in addition to the worsening of the noted complaints. She denies: recent illness, tampon use, multiple sexual partners, new medication, new soap or food ingestion, recent travel or hiking, smoking, alcohol or illicit drug use, wt loss, or history of vasculitis. She has a family history only significant for HTN.  Denies: headache, chest pain, diarrhea, constipation or dysuria.   Pt was seen and evaluated in ER on  at which time she was having back pain with newly developing rash on her lumbosacral and abdominal area, at that time she was discharged on Robaxin, benadryl and ibuprofen - she reported only minimal relief. She fevers began to develop on the following day. (03 May 2017 06:18)    Today:  pt notes back pain is improved. Notes that the rash on her lumbar back regio is still itching.     ROS:   no fevers  no chills  no cough  no vomiting    HEALTH ISSUES - PROBLEM Dx:  Rash: Rash  Fever: Fever  Need for prophylactic measure: Need for prophylactic measure  Back pain: Back pain  Anemia, unspecified type: Anemia, unspecified type  Cellulitis, unspecified cellulitis site: Cellulitis, unspecified cellulitis site  Pelvic inflammatory disease, acute: Pelvic inflammatory disease, acute  Sepsis, due to unspecified organism: Sepsis, due to unspecified organism    PAST MEDICAL & SURGICAL HISTORY:  Anemia, unspecified type  Back pain  No pertinent past medical history  S/P dilation and curettage  S/P tubal ligation  S/P  section: x 4  No significant past surgical history    MEDICATIONS  (STANDING):  vancomycin  IVPB 1000milliGRAM(s) IV Intermittent every 8 hours  piperacillin/tazobactam IVPB. 3.375Gram(s) IV Intermittent every 8 hours  ferrous    sulfate 325milliGRAM(s) Oral daily  ascorbic acid 500milliGRAM(s) Oral daily  fluocinonide 0.05% Ointment 1Application(s) Topical two times a day  AQUAPHOR (petrolatum Ointment) 1Application(s) Topical two times a day    MEDICATIONS  (PRN):  acetaminophen   Tablet 650milliGRAM(s) Oral every 6 hours PRN For Temp greater than 38 C (100.4 F)  acetaminophen   Tablet. 650milliGRAM(s) Oral every 6 hours PRN Mild Pain (1 - 3)  oxyCODONE  5 mG/acetaminophen 325 mG 1Tablet(s) Oral every 4 hours PRN Severe Pain (7 - 10)  LABS:                     8.4    4.5   )-----------( 120      ( 05 May 2017 05:07 )             27.0     138  |  104  |  7.0<L>  ----------------------------<  95  3.4<L>   |  26.0  |  0.54  Ca    8.3<L>      05 May 2017 05:07    PHYSICAL EXAM:  Vital Signs Last 24 Hrs  T(C): 37.1, Max: 37.3 ( @ 22:05)  T(F): 98.8, Max: 99.1 ( @ 22:05)  HR: 86 (80 - 93)  BP: 122/68 (116/66 - 124/70)  BP(mean): --  RR: 16 (16 - 18)  SpO2: 100% (100% - 100%)    GENERAL NAD, ALERT LAYING IN BED  HEENT NORMAL CEPHALIC ATRAUMATIC, EOMI, PERRLA  CVS: S1,S2,RRR, NO MURMUR, NO RUBS, NO GALLOPS  RESP: BL CTA, NO WHEEZING, NO RHONCHI, NO CRACKLES, NO LABORED BREATHING  ABD: SOFT, NON TENDER, NON DISTENDED, +BOWEL SOUNDS IN ALL 4 QUADRANTS  EXT: NO EDEMA  SKIN: WARM, DRY, INTACT. +raised lesions on lumbar sacral region. +petechia on palms of hands  NEURO AOX3  MUSCULOSKELETAL: ROM INTACT IN ALL 4 EXTREMITIES  RECTAL DEFFERED  PSYCH APPROPRIATE

## 2017-05-05 NOTE — PROGRESS NOTE ADULT - ASSESSMENT
40 Y/o  Female with fever (Tmax 105), diffuse non blanchable Rash, oral ulcers, vaginal ulcers, +ve Cervical motion tenderness.     - R/o PID vs infectious source secondary to D and C 2 weeks ago.   GC cultures negative. +gardenella OB noted, to cw vanco and zosyn  herpes ab ordered  - f/u vaginal cultures, oral cultures, blood cultures  - Cont IV hydration   - Acetaminophen PRN for fever    -Rash  erythematous macular papular rash  possibly secondary to infectious cause  derm consulted started on aquaphor and fluocinonide

## 2017-05-05 NOTE — PROGRESS NOTE ADULT - PROBLEM SELECTOR PLAN 4
Clinically suggestive of PID as +CMT and left adnexal tenderness, however, cultures negative to date except +Gardnerella.  For now continue current management as above and we will monitor clinically

## 2017-05-05 NOTE — PROGRESS NOTE ADULT - ASSESSMENT
A/P Pending discussion with Attending.  For now continue current mgmt as per primary team, and our recommendations.  Consults appreciated. A/P Pending discussion with Attending.  For now continue current mgmt as per primary team, and our we will follow with you.  Thank you for this consult.

## 2017-05-05 NOTE — PROGRESS NOTE ADULT - ASSESSMENT
Coxsackie A6 Hand Foot and Mouth disease--an atypical form seen in adults with higher fever, more extensive exanthem, wider distribution (not just hands feet and mouth) and longer duration--mean duration of 12 days.  She has the characteristic intraoral aphthae-like erosions that are diagnostic of this type of Coxsackie. This type has been more prevalent since 2008. She could ultimately develop desquamation of her palms and soles as well as onychomadesis--nail shedding.  There is no specific therapy required.  I conferred with Dr Hoffman, ID, who was also present at her bedside during this examination.  I no longer need to follow her, but call if things change.

## 2017-05-05 NOTE — PROGRESS NOTE ADULT - SUBJECTIVE AND OBJECTIVE BOX
INTERVAL HPI/OVERNIGHT EVENTS: patient has less back pain and less oral pain.  Rash is a little less itchy. Able to eat solid food.    MEDICATIONS  (STANDING):  vancomycin  IVPB 1000milliGRAM(s) IV Intermittent every 8 hours  piperacillin/tazobactam IVPB. 3.375Gram(s) IV Intermittent every 8 hours  ferrous    sulfate 325milliGRAM(s) Oral daily  ascorbic acid 500milliGRAM(s) Oral daily  fluocinonide 0.05% Ointment 1Application(s) Topical two times a day  AQUAPHOR (petrolatum Ointment) 1Application(s) Topical two times a day    MEDICATIONS  (PRN):  acetaminophen   Tablet 650milliGRAM(s) Oral every 6 hours PRN For Temp greater than 38 C (100.4 F)  acetaminophen   Tablet. 650milliGRAM(s) Oral every 6 hours PRN Mild Pain (1 - 3)  oxyCODONE  5 mG/acetaminophen 325 mG 1Tablet(s) Oral every 4 hours PRN Severe Pain (7 - 10)      Allergies    No Known Allergies                	      	  	    Vital Signs Last 24 Hrs  T(C): 37.1, Max: 37.3 (05-04 @ 22:05)  T(F): 98.8, Max: 99.1 (05-04 @ 22:05)  HR: 86 (80 - 93)  BP: 122/68 (116/66 - 124/70)  BP(mean): --  RR: 16 (16 - 18)  SpO2: 100% (100% - 100%)    PHYSICAL EXAM:      Constitutional: Alert, oriented x 3, sitting up in bed comfortably    Eyes: no conjunctivitis, pale, anicteric    ENMT: superficial erosions of vermillion lips with many tiny 2-3 mm shallow erosions on inner labial and soft palatal mucosae    Neck:non tender, supple with no lymphadenopathy    Skin: erythematous partially blanching macular lesions on right palm, none on left with desquamating erythematous non blanching patch of presacral area    Lymph Nodes: no axillary nodes            LABS:                        8.4    4.5   )-----------( 120      ( 05 May 2017 05:07 )             27.0     05-05    138  |  104  |  7.0<L>  ----------------------------<  95  3.4<L>   |  26.0  |  0.54    Ca    8.3<L>      05 May 2017 05:07      All cultures negative (incidental Gardnerella vaginalis on genital culture)      RADIOLOGY & ADDITIONAL TESTS: previously reviewed

## 2017-05-05 NOTE — PROGRESS NOTE ADULT - SUBJECTIVE AND OBJECTIVE BOX
40yo , LMP today, regular w/ recent hysteroscopy and D&C for menometrorrhagia, NILM papsmears, and monogamous w/ one partner, no history of STIs, who presented with fever, rash and back pain and was admitted on 5/3 for sepsis possibly 2/2 pelvic cellulitis vs. unspecified infectious process.     Subjective:  The patient feels well overall, stating no fevers, chills, or malaise as yesterday.  She states her rash has improved but still notes itchiness in palms, back, pelvic and thigh regions.  She admits heavy vaginal bleeding today changing her pad almost every hour, now 2nd day of her period; but denies CP, SOB, lightheadedness, dizziness, palpitations or LOC.  She is ambulating, tolerating regular diet.  She denies nausea and vomiting.  She is voiding.  Although she notes lower back pain with no overall improvement, her pain is controlled with med Rx.  No other complaints today.    Vital Signs Last 24 Hrs  T(C): 37.2, Max: 37.3 (- @ 22:05)  T(F): 99, Max: 99.1 (05-04 @ 22:05)  HR: 80 (79 - 93)  BP: 124/70 (116/66 - 124/70)  BP(mean): --  RR: 18 (18 - 18)  SpO2: 100% (100% - 100%)    Physical Exam:  Gen: NAD, AAO x3  HEENT: unremarkable  CVS: +S1S2, RRR, no MRG  Chest: CTA b/l, no w/r/r  Neck: supple, no thyroid abnormalities, no LNs appreciated  Abd: mild tenderness in b/l LQs L>R, no guarding/rigidity/rebound tenderness; +BS  Back: no CVAT  : deferred  Rectal: deferred  Ext: no edema, calf tenderness, or cyanosis  CNS: grossly non focal  Skin: maculo-papular rash on abdomen, suprapubic region, medial anterior thighs b/l, flanks, radial side of palms of hands b/l  Psych: appropriate mood & affect      I&O's Detail    I & Os for current day (as of 05 May 2017 07:40)  =============================================  IN:    Solution: 250 ml    Solution: 100 ml    Total IN: 350 ml  ---------------------------------------------  OUT:    Total OUT: 0 ml  ---------------------------------------------  Total NET: 350 ml        LABS:                        8.4    4.5   )-----------( 120      ( 05 May 2017 05:07 )             27.0     CBC Full  -  ( 05 May 2017 05:07 )  WBC Count : 4.5 K/uL  Hemoglobin : 8.4 g/dL  Hematocrit : 27.0 %  Platelet Count - Automated : 120 K/uL  Mean Cell Volume : 65.5 fl  Mean Cell Hemoglobin : 20.4 pg  Mean Cell Hemoglobin Concentration : 31.1 g/dL  Auto Neutrophil # : x  Auto Lymphocyte # : x  Auto Monocyte # : x  Auto Eosinophil # : x  Auto Basophil # : x  Auto Neutrophil % : x  Auto Lymphocyte % : x  Auto Monocyte % : x  Auto Eosinophil % : x  Auto Basophil % : x        138  |  104  |  7.0<L>  ----------------------------<  95  3.4<L>   |  26.0  |  0.54    Ca    8.3<L>      05 May 2017 05:07      PT/INR - ( 03 May 2017 11:36 )   PT: 12.5 sec;   INR: 1.13 ratio         PTT - ( 03 May 2017 11:36 )  PTT:26.0 sec      Culture Results:   No Beta Strep group A isolated  Culture in progress ( @ 08:58)  Culture Results:   Few Candida albicans  Moderate Routine vaginal fabiola  Culture in progress ( @ 07:08)  Culture Results:   No growth ( @ 04:36)  Culture Results:   No growth at 48 hours ( @ 22:31)  Culture Results:   No growth at 48 hours ( @ 22:30)    Procalcitonin, Serum: 0.16 ng/mL ( @ 05:47)

## 2017-05-06 VITALS
RESPIRATION RATE: 14 BRPM | SYSTOLIC BLOOD PRESSURE: 106 MMHG | DIASTOLIC BLOOD PRESSURE: 63 MMHG | HEART RATE: 73 BPM | TEMPERATURE: 98 F

## 2017-05-06 LAB
ANION GAP SERPL CALC-SCNC: 11 MMOL/L — SIGNIFICANT CHANGE UP (ref 5–17)
BUN SERPL-MCNC: 11 MG/DL — SIGNIFICANT CHANGE UP (ref 8–20)
CALCIUM SERPL-MCNC: 8.5 MG/DL — LOW (ref 8.6–10.2)
CHLORIDE SERPL-SCNC: 104 MMOL/L — SIGNIFICANT CHANGE UP (ref 98–107)
CO2 SERPL-SCNC: 27 MMOL/L — SIGNIFICANT CHANGE UP (ref 22–29)
CREAT SERPL-MCNC: 0.57 MG/DL — SIGNIFICANT CHANGE UP (ref 0.5–1.3)
GLUCOSE SERPL-MCNC: 99 MG/DL — SIGNIFICANT CHANGE UP (ref 70–115)
HCT VFR BLD CALC: 27.8 % — LOW (ref 37–47)
HGB BLD-MCNC: 8.5 G/DL — LOW (ref 12–16)
MCHC RBC-ENTMCNC: 20.3 PG — LOW (ref 27–31)
MCHC RBC-ENTMCNC: 30.6 G/DL — LOW (ref 32–36)
MCV RBC AUTO: 66.3 FL — LOW (ref 81–99)
PLATELET # BLD AUTO: 136 K/UL — LOW (ref 150–400)
POTASSIUM SERPL-MCNC: 3.8 MMOL/L — SIGNIFICANT CHANGE UP (ref 3.5–5.3)
POTASSIUM SERPL-SCNC: 3.8 MMOL/L — SIGNIFICANT CHANGE UP (ref 3.5–5.3)
RBC # BLD: 4.19 M/UL — LOW (ref 4.4–5.2)
RBC # FLD: 19.3 % — HIGH (ref 11–15.6)
SODIUM SERPL-SCNC: 142 MMOL/L — SIGNIFICANT CHANGE UP (ref 135–145)
VANCOMYCIN TROUGH SERPL-MCNC: 8 UG/ML — LOW (ref 10–20)
WBC # BLD: 4.7 K/UL — LOW (ref 4.8–10.8)
WBC # FLD AUTO: 4.7 K/UL — LOW (ref 4.8–10.8)

## 2017-05-06 PROCEDURE — 85384 FIBRINOGEN ACTIVITY: CPT

## 2017-05-06 PROCEDURE — 99285 EMERGENCY DEPT VISIT HI MDM: CPT | Mod: 25

## 2017-05-06 PROCEDURE — 87040 BLOOD CULTURE FOR BACTERIA: CPT

## 2017-05-06 PROCEDURE — 72157 MRI CHEST SPINE W/O & W/DYE: CPT

## 2017-05-06 PROCEDURE — 86592 SYPHILIS TEST NON-TREP QUAL: CPT

## 2017-05-06 PROCEDURE — 84436 ASSAY OF TOTAL THYROXINE: CPT

## 2017-05-06 PROCEDURE — 86703 HIV-1/HIV-2 1 RESULT ANTBDY: CPT

## 2017-05-06 PROCEDURE — 99239 HOSP IP/OBS DSCHRG MGMT >30: CPT

## 2017-05-06 PROCEDURE — 85730 THROMBOPLASTIN TIME PARTIAL: CPT

## 2017-05-06 PROCEDURE — 86618 LYME DISEASE ANTIBODY: CPT

## 2017-05-06 PROCEDURE — 85379 FIBRIN DEGRADATION QUANT: CPT

## 2017-05-06 PROCEDURE — 36415 COLL VENOUS BLD VENIPUNCTURE: CPT

## 2017-05-06 PROCEDURE — 83735 ASSAY OF MAGNESIUM: CPT

## 2017-05-06 PROCEDURE — 84439 ASSAY OF FREE THYROXINE: CPT

## 2017-05-06 PROCEDURE — 87070 CULTURE OTHR SPECIMN AEROBIC: CPT

## 2017-05-06 PROCEDURE — 84145 PROCALCITONIN (PCT): CPT

## 2017-05-06 PROCEDURE — 71045 X-RAY EXAM CHEST 1 VIEW: CPT

## 2017-05-06 PROCEDURE — 76856 US EXAM PELVIC COMPLETE: CPT

## 2017-05-06 PROCEDURE — 83690 ASSAY OF LIPASE: CPT

## 2017-05-06 PROCEDURE — 80202 ASSAY OF VANCOMYCIN: CPT

## 2017-05-06 PROCEDURE — 80048 BASIC METABOLIC PNL TOTAL CA: CPT

## 2017-05-06 PROCEDURE — 80053 COMPREHEN METABOLIC PANEL: CPT

## 2017-05-06 PROCEDURE — 84702 CHORIONIC GONADOTROPIN TEST: CPT

## 2017-05-06 PROCEDURE — 72158 MRI LUMBAR SPINE W/O & W/DYE: CPT

## 2017-05-06 PROCEDURE — 85610 PROTHROMBIN TIME: CPT

## 2017-05-06 PROCEDURE — 83615 LACTATE (LD) (LDH) ENZYME: CPT

## 2017-05-06 PROCEDURE — 82728 ASSAY OF FERRITIN: CPT

## 2017-05-06 PROCEDURE — 96375 TX/PRO/DX INJ NEW DRUG ADDON: CPT

## 2017-05-06 PROCEDURE — 84443 ASSAY THYROID STIM HORMONE: CPT

## 2017-05-06 PROCEDURE — 85652 RBC SED RATE AUTOMATED: CPT

## 2017-05-06 PROCEDURE — 83550 IRON BINDING TEST: CPT

## 2017-05-06 PROCEDURE — 82550 ASSAY OF CK (CPK): CPT

## 2017-05-06 PROCEDURE — 71275 CT ANGIOGRAPHY CHEST: CPT

## 2017-05-06 PROCEDURE — 74176 CT ABD & PELVIS W/O CONTRAST: CPT

## 2017-05-06 PROCEDURE — 93005 ELECTROCARDIOGRAM TRACING: CPT

## 2017-05-06 PROCEDURE — 81003 URINALYSIS AUTO W/O SCOPE: CPT

## 2017-05-06 PROCEDURE — 84466 ASSAY OF TRANSFERRIN: CPT

## 2017-05-06 PROCEDURE — 86666 EHRLICHIA ANTIBODY: CPT

## 2017-05-06 PROCEDURE — 83605 ASSAY OF LACTIC ACID: CPT

## 2017-05-06 PROCEDURE — 87086 URINE CULTURE/COLONY COUNT: CPT

## 2017-05-06 PROCEDURE — 86140 C-REACTIVE PROTEIN: CPT

## 2017-05-06 PROCEDURE — 96374 THER/PROPH/DIAG INJ IV PUSH: CPT

## 2017-05-06 PROCEDURE — 87081 CULTURE SCREEN ONLY: CPT

## 2017-05-06 PROCEDURE — 85027 COMPLETE CBC AUTOMATED: CPT

## 2017-05-06 PROCEDURE — 84480 ASSAY TRIIODOTHYRONINE (T3): CPT

## 2017-05-06 RX ORDER — ACETAMINOPHEN 500 MG
2 TABLET ORAL
Qty: 0 | Refills: 0 | COMMUNITY
Start: 2017-05-06

## 2017-05-06 RX ORDER — FERROUS SULFATE 325(65) MG
1 TABLET ORAL
Qty: 30 | Refills: 0 | OUTPATIENT
Start: 2017-05-06

## 2017-05-06 RX ORDER — FLUOCINONIDE/EMOLLIENT BASE 0.05 %
1 CREAM (GRAM) TOPICAL
Qty: 14 | Refills: 0 | OUTPATIENT
Start: 2017-05-06

## 2017-05-06 RX ORDER — PETROLATUM,WHITE
1 JELLY (GRAM) TOPICAL
Qty: 14 | Refills: 0 | OUTPATIENT
Start: 2017-05-06

## 2017-05-06 RX ORDER — ASCORBIC ACID 60 MG
1 TABLET,CHEWABLE ORAL
Qty: 30 | Refills: 0 | OUTPATIENT
Start: 2017-05-06

## 2017-05-06 RX ADMIN — Medication 1 APPLICATION(S): at 05:58

## 2017-05-06 RX ADMIN — PIPERACILLIN AND TAZOBACTAM 25 GRAM(S): 4; .5 INJECTION, POWDER, LYOPHILIZED, FOR SOLUTION INTRAVENOUS at 08:49

## 2017-05-06 RX ADMIN — Medication 325 MILLIGRAM(S): at 11:37

## 2017-05-06 RX ADMIN — PIPERACILLIN AND TAZOBACTAM 25 GRAM(S): 4; .5 INJECTION, POWDER, LYOPHILIZED, FOR SOLUTION INTRAVENOUS at 00:14

## 2017-05-06 RX ADMIN — Medication 250 MILLIGRAM(S): at 05:58

## 2017-05-06 RX ADMIN — Medication 500 MILLIGRAM(S): at 11:37

## 2017-05-06 NOTE — DISCHARGE NOTE ADULT - HOSPITAL COURSE
Admitted with Coxsackie A6 Hand foot and mouth disease atypical form with viral xanthem, incidental PID. treated with anitbiotics initially. c/s neg. gardnerella + per OBGYN not significant. Medically stable and agreeable with discharge and follow up plan. Patient was advised to return to ED if any symptoms occur or worsen.  Plan- to f/u OBGYN post d/c in 1-2 weeks.    T(C): 36.8, Max: 37.4 (05-06 @ 05:57)  T(F): 98.2, Max: 99.4 (05-06 @ 05:57)  HR: 73 (73 - 92)  BP: 106/63 (100/66 - 106/63)  BP(mean): --  RR: 14 (14 - 18)  SpO2: --    ACCUCHECKS    PHYSICAL EXAM-  GENERAL NAD, ALERT LAYING IN BED  HEENT NORMAL CEPHALIC ATRAUMATIC, EOMI, PERRLA  CVS: S1,S2,RRR, NO MURMUR, NO RUBS, NO GALLOPS  RESP: BL CTA, NO WHEEZING, NO RHONCHI, NO CRACKLES, NO LABORED BREATHING  ABD: SOFT, NON TENDER, NON DISTENDED, +BOWEL SOUNDS IN ALL 4 QUADRANTS  EXT: NO EDEMA  SKIN: WARM, DRY, INTACT. +raised lesions on lumbar sacral region. +petechia on palms of hands  NEURO AOX3  MUSCULOSKELETAL: ROM INTACT IN ALL 4 EXTREMITIES  RECTAL Deferred  PSYCH APPROPRIATE      LABS:                        8.5    4.7   )-----------( 136      ( 06 May 2017 05:40 )             27.8     05-06    142  |  104  |  11.0  ----------------------------<  99  3.8   |  27.0  |  0.57    Ca    8.5<L>      06 May 2017 05:40    Time: 45 mins

## 2017-05-06 NOTE — DISCHARGE NOTE ADULT - SECONDARY DIAGNOSIS.
Anemia, unspecified type Back pain Pelvic inflammatory disease, acute Sepsis, due to unspecified organism

## 2017-05-06 NOTE — DISCHARGE NOTE ADULT - CARE PLAN
Principal Discharge DX:	Coxsackie virus infection  Goal:	resolved  Instructions for follow-up, activity and diet:	follow with PMD in 1 week. follow with OBGYN in 1 week  Secondary Diagnosis:	Anemia, unspecified type  Secondary Diagnosis:	Back pain  Secondary Diagnosis:	Pelvic inflammatory disease, acute  Secondary Diagnosis:	Sepsis, due to unspecified organism

## 2017-05-06 NOTE — DISCHARGE NOTE ADULT - MEDICATION SUMMARY - MEDICATIONS TO STOP TAKING
I will STOP taking the medications listed below when I get home from the hospital:    ibuprofen 600 mg oral tablet  -- 1 tab(s) by mouth every 6 hours  -- Do not take this drug if you are pregnant.  It is very important that you take or use this exactly as directed.  Do not skip doses or discontinue unless directed by your doctor.  May cause drowsiness or dizziness.  Obtain medical advice before taking any non-prescription drugs as some may affect the action of this medication.  Take with food or milk.    Robaxin-750 oral tablet  -- 2 tab(s) by mouth 3 times a day  -- May cause drowsiness.  Alcohol may intensify this effect.  Use care when operating dangerous machinery.

## 2017-05-06 NOTE — DISCHARGE NOTE ADULT - PATIENT PORTAL LINK FT
“You can access the FollowHealth Patient Portal, offered by [facility], by registering with the following website: [email or web address]”

## 2017-05-06 NOTE — DISCHARGE NOTE ADULT - CARE PROVIDER_API CALL
PMD,   If you do not have a primary physician, contact the [facility] [place] [address] [phone] located on [facility] [facility], [place] [address].  Phone: (   )    -  Fax: (   )    -    Moises Piña), Obstetrics and Gynecology  [address] [place] [address]  Phone: [phone]  Fax: [phone]

## 2017-05-06 NOTE — DISCHARGE NOTE ADULT - MEDICATION SUMMARY - MEDICATIONS TO TAKE
I will START or STAY ON the medications listed below when I get home from the hospital:    acetaminophen 325 mg oral tablet  -- 2 tab(s) by mouth every 6 hours, As needed, For Temp greater than 38 C (100.4 F)  -- Indication: For Pain, fever    petrolatum topical ointment  -- 1 application on skin 2 times a day  -- Indication: For moisturize skin    fluocinonide 0.05% topical ointment  -- 1 application on skin 2 times a day  -- Indication: For Skin rash    ferrous sulfate 325 mg (65 mg elemental iron) oral tablet  -- 1 tab(s) by mouth once a day  -- Indication: For iron for anemia    Vitamin B12  -- 1 tab(s) by mouth once a day  -- Indication: For vitamin    ascorbic acid 500 mg oral tablet  -- 1 tab(s) by mouth once a day  -- Indication: For vitamin

## 2017-05-06 NOTE — DISCHARGE NOTE ADULT - PROVIDER TOKENS
FREE:[LAST:[PMD],PHONE:[(   )    -],FAX:[(   )    -],ADDRESS:[If you do not have a primary physician, contact the [facility] at [place] [phone] located on [address] [place] [address].]],TOKEN:'[id number]'

## 2017-05-07 LAB
CULTURE RESULTS: SIGNIFICANT CHANGE UP
CULTURE RESULTS: SIGNIFICANT CHANGE UP
SPECIMEN SOURCE: SIGNIFICANT CHANGE UP
SPECIMEN SOURCE: SIGNIFICANT CHANGE UP

## 2017-05-09 ENCOUNTER — TRANSCRIPTION ENCOUNTER (OUTPATIENT)
Age: 42
End: 2017-05-09

## 2017-05-18 ENCOUNTER — APPOINTMENT (OUTPATIENT)
Dept: INTERNAL MEDICINE | Facility: CLINIC | Age: 42
End: 2017-05-18

## 2017-06-28 PROBLEM — M54.9 DORSALGIA, UNSPECIFIED: Chronic | Status: ACTIVE | Noted: 2017-05-02

## 2017-07-18 ENCOUNTER — OUTPATIENT (OUTPATIENT)
Dept: OUTPATIENT SERVICES | Facility: HOSPITAL | Age: 42
LOS: 1 days | End: 2017-07-18
Payer: MEDICAID

## 2017-07-18 VITALS
WEIGHT: 154.98 LBS | SYSTOLIC BLOOD PRESSURE: 98 MMHG | RESPIRATION RATE: 16 BRPM | TEMPERATURE: 98 F | HEART RATE: 80 BPM | DIASTOLIC BLOOD PRESSURE: 61 MMHG | HEIGHT: 64 IN

## 2017-07-18 DIAGNOSIS — D25.9 LEIOMYOMA OF UTERUS, UNSPECIFIED: ICD-10-CM

## 2017-07-18 DIAGNOSIS — Z98.890 OTHER SPECIFIED POSTPROCEDURAL STATES: Chronic | ICD-10-CM

## 2017-07-18 DIAGNOSIS — Z98.89 OTHER SPECIFIED POSTPROCEDURAL STATES: Chronic | ICD-10-CM

## 2017-07-18 DIAGNOSIS — Z98.51 TUBAL LIGATION STATUS: Chronic | ICD-10-CM

## 2017-07-18 DIAGNOSIS — O03.9 COMPLETE OR UNSPECIFIED SPONTANEOUS ABORTION WITHOUT COMPLICATION: Chronic | ICD-10-CM

## 2017-07-18 DIAGNOSIS — Z01.818 ENCOUNTER FOR OTHER PREPROCEDURAL EXAMINATION: ICD-10-CM

## 2017-07-18 LAB
ANION GAP SERPL CALC-SCNC: 11 MMOL/L — SIGNIFICANT CHANGE UP (ref 5–17)
APPEARANCE UR: CLEAR — SIGNIFICANT CHANGE UP
BASOPHILS # BLD AUTO: 0 K/UL — SIGNIFICANT CHANGE UP (ref 0–0.2)
BASOPHILS NFR BLD AUTO: 0.1 % — SIGNIFICANT CHANGE UP (ref 0–2)
BILIRUB UR-MCNC: NEGATIVE — SIGNIFICANT CHANGE UP
BLD GP AB SCN SERPL QL: SIGNIFICANT CHANGE UP
BUN SERPL-MCNC: 12 MG/DL — SIGNIFICANT CHANGE UP (ref 8–20)
CALCIUM SERPL-MCNC: 9.6 MG/DL — SIGNIFICANT CHANGE UP (ref 8.6–10.2)
CHLORIDE SERPL-SCNC: 103 MMOL/L — SIGNIFICANT CHANGE UP (ref 98–107)
CO2 SERPL-SCNC: 25 MMOL/L — SIGNIFICANT CHANGE UP (ref 22–29)
COLOR SPEC: YELLOW — SIGNIFICANT CHANGE UP
CREAT SERPL-MCNC: 0.59 MG/DL — SIGNIFICANT CHANGE UP (ref 0.5–1.3)
DIFF PNL FLD: NEGATIVE — SIGNIFICANT CHANGE UP
EOSINOPHIL # BLD AUTO: 0.1 K/UL — SIGNIFICANT CHANGE UP (ref 0–0.5)
EOSINOPHIL NFR BLD AUTO: 1.2 % — SIGNIFICANT CHANGE UP (ref 0–6)
GLUCOSE SERPL-MCNC: 99 MG/DL — SIGNIFICANT CHANGE UP (ref 70–115)
GLUCOSE UR QL: NEGATIVE MG/DL — SIGNIFICANT CHANGE UP
HCT VFR BLD CALC: 30.5 % — LOW (ref 37–47)
HGB BLD-MCNC: 9.3 G/DL — LOW (ref 12–16)
KETONES UR-MCNC: NEGATIVE — SIGNIFICANT CHANGE UP
LEUKOCYTE ESTERASE UR-ACNC: NEGATIVE — SIGNIFICANT CHANGE UP
LYMPHOCYTES # BLD AUTO: 1.5 K/UL — SIGNIFICANT CHANGE UP (ref 1–4.8)
LYMPHOCYTES # BLD AUTO: 22.7 % — SIGNIFICANT CHANGE UP (ref 20–55)
MCHC RBC-ENTMCNC: 21 PG — LOW (ref 27–31)
MCHC RBC-ENTMCNC: 30.5 G/DL — LOW (ref 32–36)
MCV RBC AUTO: 68.8 FL — LOW (ref 81–99)
MONOCYTES # BLD AUTO: 0.5 K/UL — SIGNIFICANT CHANGE UP (ref 0–0.8)
MONOCYTES NFR BLD AUTO: 7.7 % — SIGNIFICANT CHANGE UP (ref 3–10)
NEUTROPHILS # BLD AUTO: 4.6 K/UL — SIGNIFICANT CHANGE UP (ref 1.8–8)
NEUTROPHILS NFR BLD AUTO: 68.3 % — SIGNIFICANT CHANGE UP (ref 37–73)
NITRITE UR-MCNC: NEGATIVE — SIGNIFICANT CHANGE UP
PH UR: 7 — SIGNIFICANT CHANGE UP (ref 5–8)
PLATELET # BLD AUTO: 243 K/UL — SIGNIFICANT CHANGE UP (ref 150–400)
POTASSIUM SERPL-MCNC: 4.2 MMOL/L — SIGNIFICANT CHANGE UP (ref 3.5–5.3)
POTASSIUM SERPL-SCNC: 4.2 MMOL/L — SIGNIFICANT CHANGE UP (ref 3.5–5.3)
PROT UR-MCNC: NEGATIVE MG/DL — SIGNIFICANT CHANGE UP
RBC # BLD: 4.43 M/UL — SIGNIFICANT CHANGE UP (ref 4.4–5.2)
RBC # FLD: 19.4 % — HIGH (ref 11–15.6)
SODIUM SERPL-SCNC: 139 MMOL/L — SIGNIFICANT CHANGE UP (ref 135–145)
SP GR SPEC: 1.01 — SIGNIFICANT CHANGE UP (ref 1.01–1.02)
TYPE + AB SCN PNL BLD: SIGNIFICANT CHANGE UP
UROBILINOGEN FLD QL: NEGATIVE MG/DL — SIGNIFICANT CHANGE UP
WBC # BLD: 6.8 K/UL — SIGNIFICANT CHANGE UP (ref 4.8–10.8)
WBC # FLD AUTO: 6.8 K/UL — SIGNIFICANT CHANGE UP (ref 4.8–10.8)

## 2017-07-18 RX ORDER — PREGABALIN 225 MG/1
1 CAPSULE ORAL
Qty: 0 | Refills: 0 | COMMUNITY

## 2017-07-18 NOTE — H&P PST ADULT - HISTORY OF PRESENT ILLNESS
41 year old female presents with c/o heavy painful periods. LMP 2017 lasted for 2 weeks. . Last pap 2017 normal as per pt. D&C done in MAY 2017 with no improvement , pt now presents for hysterectomy. Sono recently done and fibroids larger than previous years.

## 2017-07-18 NOTE — H&P PST ADULT - NSANTHOSAYNRD_GEN_A_CORE
No. MARIUSZ screening performed.  STOP BANG Legend: 0-2 = LOW Risk; 3-4 = INTERMEDIATE Risk; 5-8 = HIGH Risk

## 2017-07-25 ENCOUNTER — TRANSCRIPTION ENCOUNTER (OUTPATIENT)
Age: 42
End: 2017-07-25

## 2017-07-25 ENCOUNTER — INPATIENT (INPATIENT)
Facility: HOSPITAL | Age: 42
LOS: 1 days | Discharge: HOME | DRG: 743 | End: 2017-07-27
Attending: OBSTETRICS & GYNECOLOGY | Admitting: OBSTETRICS & GYNECOLOGY
Payer: MEDICAID

## 2017-07-25 ENCOUNTER — RESULT REVIEW (OUTPATIENT)
Age: 42
End: 2017-07-25

## 2017-07-25 VITALS
TEMPERATURE: 98 F | HEIGHT: 64 IN | DIASTOLIC BLOOD PRESSURE: 61 MMHG | OXYGEN SATURATION: 100 % | HEART RATE: 78 BPM | WEIGHT: 154.98 LBS | SYSTOLIC BLOOD PRESSURE: 113 MMHG | RESPIRATION RATE: 16 BRPM

## 2017-07-25 DIAGNOSIS — D25.9 LEIOMYOMA OF UTERUS, UNSPECIFIED: ICD-10-CM

## 2017-07-25 DIAGNOSIS — Z98.89 OTHER SPECIFIED POSTPROCEDURAL STATES: Chronic | ICD-10-CM

## 2017-07-25 DIAGNOSIS — Z98.890 OTHER SPECIFIED POSTPROCEDURAL STATES: Chronic | ICD-10-CM

## 2017-07-25 DIAGNOSIS — O03.9 COMPLETE OR UNSPECIFIED SPONTANEOUS ABORTION WITHOUT COMPLICATION: Chronic | ICD-10-CM

## 2017-07-25 DIAGNOSIS — Z98.51 TUBAL LIGATION STATUS: Chronic | ICD-10-CM

## 2017-07-25 LAB — ABO RH CONFIRMATION: SIGNIFICANT CHANGE UP

## 2017-07-25 PROCEDURE — 86900 BLOOD TYPING SEROLOGIC ABO: CPT

## 2017-07-25 PROCEDURE — 86901 BLOOD TYPING SEROLOGIC RH(D): CPT

## 2017-07-25 PROCEDURE — 86850 RBC ANTIBODY SCREEN: CPT

## 2017-07-25 PROCEDURE — 85027 COMPLETE CBC AUTOMATED: CPT

## 2017-07-25 PROCEDURE — 81003 URINALYSIS AUTO W/O SCOPE: CPT

## 2017-07-25 PROCEDURE — 88307 TISSUE EXAM BY PATHOLOGIST: CPT | Mod: 26

## 2017-07-25 PROCEDURE — 86920 COMPATIBILITY TEST SPIN: CPT

## 2017-07-25 PROCEDURE — G0463: CPT

## 2017-07-25 PROCEDURE — 88302 TISSUE EXAM BY PATHOLOGIST: CPT | Mod: 26

## 2017-07-25 PROCEDURE — 80048 BASIC METABOLIC PNL TOTAL CA: CPT

## 2017-07-25 PROCEDURE — 36415 COLL VENOUS BLD VENIPUNCTURE: CPT

## 2017-07-25 RX ORDER — SODIUM CHLORIDE 9 MG/ML
1000 INJECTION, SOLUTION INTRAVENOUS
Qty: 0 | Refills: 0 | Status: DISCONTINUED | OUTPATIENT
Start: 2017-07-25 | End: 2017-07-25

## 2017-07-25 RX ORDER — SODIUM CHLORIDE 9 MG/ML
3 INJECTION INTRAMUSCULAR; INTRAVENOUS; SUBCUTANEOUS EVERY 8 HOURS
Qty: 0 | Refills: 0 | Status: DISCONTINUED | OUTPATIENT
Start: 2017-07-25 | End: 2017-07-25

## 2017-07-25 RX ORDER — SODIUM CHLORIDE 9 MG/ML
1000 INJECTION, SOLUTION INTRAVENOUS
Qty: 0 | Refills: 0 | Status: DISCONTINUED | OUTPATIENT
Start: 2017-07-25 | End: 2017-07-26

## 2017-07-25 RX ORDER — CEFAZOLIN SODIUM 1 G
2000 VIAL (EA) INJECTION ONCE
Qty: 0 | Refills: 0 | Status: COMPLETED | OUTPATIENT
Start: 2017-07-25 | End: 2017-07-25

## 2017-07-25 RX ORDER — HYDROMORPHONE HYDROCHLORIDE 2 MG/ML
0.5 INJECTION INTRAMUSCULAR; INTRAVENOUS; SUBCUTANEOUS
Qty: 0 | Refills: 0 | Status: DISCONTINUED | OUTPATIENT
Start: 2017-07-25 | End: 2017-07-25

## 2017-07-25 RX ORDER — OXYCODONE AND ACETAMINOPHEN 5; 325 MG/1; MG/1
2 TABLET ORAL EVERY 6 HOURS
Qty: 0 | Refills: 0 | Status: DISCONTINUED | OUTPATIENT
Start: 2017-07-25 | End: 2017-07-27

## 2017-07-25 RX ORDER — MORPHINE SULFATE 50 MG/1
4 CAPSULE, EXTENDED RELEASE ORAL EVERY 4 HOURS
Qty: 0 | Refills: 0 | Status: DISCONTINUED | OUTPATIENT
Start: 2017-07-25 | End: 2017-07-27

## 2017-07-25 RX ORDER — OXYCODONE AND ACETAMINOPHEN 5; 325 MG/1; MG/1
1 TABLET ORAL EVERY 4 HOURS
Qty: 0 | Refills: 0 | Status: DISCONTINUED | OUTPATIENT
Start: 2017-07-25 | End: 2017-07-27

## 2017-07-25 RX ORDER — MORPHINE SULFATE 50 MG/1
2 CAPSULE, EXTENDED RELEASE ORAL EVERY 4 HOURS
Qty: 0 | Refills: 0 | Status: DISCONTINUED | OUTPATIENT
Start: 2017-07-25 | End: 2017-07-27

## 2017-07-25 RX ORDER — ONDANSETRON 8 MG/1
4 TABLET, FILM COATED ORAL ONCE
Qty: 0 | Refills: 0 | Status: DISCONTINUED | OUTPATIENT
Start: 2017-07-25 | End: 2017-07-27

## 2017-07-25 RX ORDER — ONDANSETRON 8 MG/1
4 TABLET, FILM COATED ORAL ONCE
Qty: 0 | Refills: 0 | Status: DISCONTINUED | OUTPATIENT
Start: 2017-07-25 | End: 2017-07-25

## 2017-07-25 RX ADMIN — OXYCODONE AND ACETAMINOPHEN 1 TABLET(S): 5; 325 TABLET ORAL at 20:31

## 2017-07-25 RX ADMIN — OXYCODONE AND ACETAMINOPHEN 1 TABLET(S): 5; 325 TABLET ORAL at 21:15

## 2017-07-25 RX ADMIN — MORPHINE SULFATE 4 MILLIGRAM(S): 50 CAPSULE, EXTENDED RELEASE ORAL at 22:20

## 2017-07-25 RX ADMIN — SODIUM CHLORIDE 125 MILLILITER(S): 9 INJECTION, SOLUTION INTRAVENOUS at 22:20

## 2017-07-25 RX ADMIN — MORPHINE SULFATE 4 MILLIGRAM(S): 50 CAPSULE, EXTENDED RELEASE ORAL at 23:00

## 2017-07-26 RX ORDER — KETOROLAC TROMETHAMINE 30 MG/ML
30 SYRINGE (ML) INJECTION EVERY 6 HOURS
Qty: 0 | Refills: 0 | Status: DISCONTINUED | OUTPATIENT
Start: 2017-07-26 | End: 2017-07-27

## 2017-07-26 RX ADMIN — Medication 30 MILLIGRAM(S): at 12:42

## 2017-07-26 RX ADMIN — SODIUM CHLORIDE 125 MILLILITER(S): 9 INJECTION, SOLUTION INTRAVENOUS at 17:38

## 2017-07-26 RX ADMIN — Medication 30 MILLIGRAM(S): at 20:50

## 2017-07-26 RX ADMIN — OXYCODONE AND ACETAMINOPHEN 2 TABLET(S): 5; 325 TABLET ORAL at 03:15

## 2017-07-26 RX ADMIN — Medication 30 MILLIGRAM(S): at 21:45

## 2017-07-26 RX ADMIN — Medication 30 MILLIGRAM(S): at 03:35

## 2017-07-26 RX ADMIN — Medication 30 MILLIGRAM(S): at 04:30

## 2017-07-26 RX ADMIN — OXYCODONE AND ACETAMINOPHEN 2 TABLET(S): 5; 325 TABLET ORAL at 02:18

## 2017-07-26 RX ADMIN — Medication 10 MILLIGRAM(S): at 12:39

## 2017-07-26 RX ADMIN — Medication 30 MILLIGRAM(S): at 13:00

## 2017-07-26 NOTE — PROGRESS NOTE ADULT - SUBJECTIVE AND OBJECTIVE BOX
This is a 41y woman who is s/p: Uterine leiomyoma  Hysterectomy, abdominal, with BS.      She is now postoperative day:    Subjective:  The patient feels well.  She is  ambulating and tolerating a diet  She is yet to have  bowel movements and flatus  She reports no breathing problems  She reports no headache or visual changes    Physical exam:  She generally looks and feels well    Vital Signs Last 24 Hrs  T(C): 37.2 (26 Jul 2017 05:30), Max: 37.2 (26 Jul 2017 05:30)  T(F): 99 (26 Jul 2017 05:30), Max: 99 (26 Jul 2017 05:30)  HR: 92 (26 Jul 2017 05:30) (65 - 92)  BP: 126/89 (26 Jul 2017 05:30) (97/43 - 136/81)  BP(mean): --  RR: 18 (26 Jul 2017 05:30) (9 - 18)  SpO2: 100% (26 Jul 2017 05:30) (100% - 100%)    Lungs: Normal  Heart: Regular rate and rhythm  Abdomen: Soft, nontender, no distension , the incision is clean dry and intact  Pelvic: Normal vaginal spotting noted  Ext: No DVT signs, warm extremities, normal pulses    LABS:                Allergies    No Known Allergies    Intolerances      MEDICATIONS  (STANDING):  ceFAZolin   IVPB 2000 milliGRAM(s) IV Intermittent once  lactated ringers. 1000 milliLiter(s) (125 mL/Hr) IV Continuous <Continuous>  ondansetron Injectable 4 milliGRAM(s) IV Push once    MEDICATIONS  (PRN):  oxyCODONE    5 mG/acetaminophen 325 mG 1 Tablet(s) Oral every 4 hours PRN Moderate Pain  oxyCODONE    5 mG/acetaminophen 325 mG 2 Tablet(s) Oral every 6 hours PRN Severe Pain  morphine  - Injectable 2 milliGRAM(s) IV Push every 4 hours PRN Moderate Pain  morphine  - Injectable 4 milliGRAM(s) IV Push every 4 hours PRN Severe Pain  ketorolac   Injectable 30 milliGRAM(s) IV Push every 6 hours PRN Severe Pain (7 - 10)      RADIOLOGY & ADDITIONAL TESTS:    Assessment and Plan  Abdominal Hysterectomy Day:  She feels well  Continue the current pain medication  Encourage ISS & Ambulation  Encourage regular diet   DVT ppx: SCDs when not ambulating  She is stable, tolerates a diet and has normal flatus and bowel movements  She will be discharged on Day 2,3 or 4   according to the normal criteria.

## 2017-07-27 ENCOUNTER — TRANSCRIPTION ENCOUNTER (OUTPATIENT)
Age: 42
End: 2017-07-27

## 2017-07-27 VITALS
TEMPERATURE: 98 F | SYSTOLIC BLOOD PRESSURE: 114 MMHG | HEART RATE: 85 BPM | DIASTOLIC BLOOD PRESSURE: 77 MMHG | RESPIRATION RATE: 15 BRPM | OXYGEN SATURATION: 99 %

## 2017-07-27 PROCEDURE — 86850 RBC ANTIBODY SCREEN: CPT

## 2017-07-27 PROCEDURE — 36415 COLL VENOUS BLD VENIPUNCTURE: CPT

## 2017-07-27 PROCEDURE — 88307 TISSUE EXAM BY PATHOLOGIST: CPT

## 2017-07-27 PROCEDURE — 86900 BLOOD TYPING SEROLOGIC ABO: CPT

## 2017-07-27 PROCEDURE — 88302 TISSUE EXAM BY PATHOLOGIST: CPT

## 2017-07-27 RX ADMIN — Medication 30 MILLIGRAM(S): at 04:07

## 2017-07-27 RX ADMIN — Medication 30 MILLIGRAM(S): at 05:00

## 2017-07-27 NOTE — DISCHARGE NOTE ADULT - MEDICATION SUMMARY - MEDICATIONS TO TAKE
I will START or STAY ON the medications listed below when I get home from the hospital:    Percocet 5/325 325 mg-5 mg oral tablet  -- 1 tab(s) by mouth every 6 hours MDD:4  -- Caution federal law prohibits the transfer of this drug to any person other  than the person for whom it was prescribed.  May cause drowsiness.  Alcohol may intensify this effect.  Use care when operating dangerous machinery.  This prescription cannot be refilled.  This product contains acetaminophen.  Do not use  with any other product containing acetaminophen to prevent possible liver damage.  Using more of this medication than prescribed may cause serious breathing problems.    -- Indication: For Uterine leiomyoma

## 2017-07-27 NOTE — DISCHARGE NOTE ADULT - PATIENT PORTAL LINK FT
“You can access the FollowHealth Patient Portal, offered by [facility], by registering with the following website: [email or web address]”

## 2017-07-27 NOTE — DISCHARGE NOTE ADULT - NS AS ACTIVITY OBS
Showering allowed/Walking-Indoors allowed/Do not drive or operate machinery/Stairs allowed/Bathing allowed/Do not make important decisions/Walking-Outdoors allowed/No Heavy lifting/straining

## 2017-07-27 NOTE — DISCHARGE NOTE ADULT - CARE PLAN
Principal Discharge DX:	Uterine leiomyoma, unspecified location  Goal:	recovery  Instructions for follow-up, activity and diet:	regular  Secondary Diagnosis:	Back pain, unspecified back location, unspecified back pain laterality, unspecified chronicity

## 2017-07-27 NOTE — DISCHARGE NOTE ADULT - CARE PROVIDER_API CALL
Moises Piña), Obstetrics and Gynecology  [address] [place] [address]  Phone: [phone]  Fax: [phone]

## 2017-07-27 NOTE — PROGRESS NOTE ADULT - SUBJECTIVE AND OBJECTIVE BOX
This is a 41y woman who is s/p: Uterine leiomyoma  Hysterectomy, abdominal      She is now postoperative day:2    Subjective:  The patient feels well.  She is ambulating and tolerating a diet  She is having bowel movements and flatus  She reports no breathing problems  She reports no headache or visual changes    Physical exam:  She generally looks and feels well    Vital Signs Last 24 Hrs  T(C): 36.8 (27 Jul 2017 07:45), Max: 37.3 (26 Jul 2017 09:59)  T(F): 98.3 (27 Jul 2017 07:45), Max: 99.1 (26 Jul 2017 09:59)  HR: 85 (27 Jul 2017 07:45) (78 - 97)  BP: 114/77 (27 Jul 2017 07:45) (109/58 - 120/75)  BP(mean): --  RR: 15 (27 Jul 2017 07:45) (15 - 19)  SpO2: 99% (27 Jul 2017 07:45) (96% - 100%)    Lungs: Normal  Heart: Regular rate and rhythm  Abdomen: Soft, nontender, no distension , the incision is clean dry and intact  Pelvic: Normal vaginal spotting noted  Ext: No DVT signs, warm extremities, normal pulses    LABS:                Allergies    No Known Allergies    Intolerances      MEDICATIONS  (STANDING):  ondansetron Injectable 4 milliGRAM(s) IV Push once  bisacodyl Suppository 10 milliGRAM(s) Rectal every 6 hours    MEDICATIONS  (PRN):  oxyCODONE    5 mG/acetaminophen 325 mG 1 Tablet(s) Oral every 4 hours PRN Moderate Pain  oxyCODONE    5 mG/acetaminophen 325 mG 2 Tablet(s) Oral every 6 hours PRN Severe Pain  morphine  - Injectable 2 milliGRAM(s) IV Push every 4 hours PRN Moderate Pain  morphine  - Injectable 4 milliGRAM(s) IV Push every 4 hours PRN Severe Pain  ketorolac   Injectable 30 milliGRAM(s) IV Push every 6 hours PRN Severe Pain (7 - 10)      RADIOLOGY & ADDITIONAL TESTS:    Assessment and Plan  Abdominal Hysterectomy Day:  She feels well  Continue the current pain medication  Encourage ISS & Ambulation  Encourage regular diet   DVT ppx: SCDs when not ambulating  She is stable, tolerates a diet and has normal flatus and bowel movements  She will be discharged on Day 2,3 or 4   according to the normal criteria.

## 2017-07-31 LAB — SURGICAL PATHOLOGY FINAL REPORT - CH: SIGNIFICANT CHANGE UP

## 2017-09-16 ENCOUNTER — EMERGENCY (EMERGENCY)
Facility: HOSPITAL | Age: 42
LOS: 1 days | Discharge: HOME | End: 2017-09-16
Attending: EMERGENCY MEDICINE
Payer: MEDICAID

## 2017-09-16 VITALS
SYSTOLIC BLOOD PRESSURE: 111 MMHG | OXYGEN SATURATION: 100 % | TEMPERATURE: 98 F | WEIGHT: 149.91 LBS | DIASTOLIC BLOOD PRESSURE: 63 MMHG | HEIGHT: 64 IN | RESPIRATION RATE: 18 BRPM

## 2017-09-16 DIAGNOSIS — Z98.89 OTHER SPECIFIED POSTPROCEDURAL STATES: Chronic | ICD-10-CM

## 2017-09-16 DIAGNOSIS — Z98.890 OTHER SPECIFIED POSTPROCEDURAL STATES: Chronic | ICD-10-CM

## 2017-09-16 DIAGNOSIS — Z98.51 TUBAL LIGATION STATUS: Chronic | ICD-10-CM

## 2017-09-16 DIAGNOSIS — O03.9 COMPLETE OR UNSPECIFIED SPONTANEOUS ABORTION WITHOUT COMPLICATION: Chronic | ICD-10-CM

## 2017-09-16 PROCEDURE — 99284 EMERGENCY DEPT VISIT MOD MDM: CPT | Mod: 25

## 2017-09-16 RX ORDER — ONDANSETRON 8 MG/1
4 TABLET, FILM COATED ORAL ONCE
Qty: 0 | Refills: 0 | Status: COMPLETED | OUTPATIENT
Start: 2017-09-16 | End: 2017-09-16

## 2017-09-16 RX ORDER — SODIUM CHLORIDE 9 MG/ML
3 INJECTION INTRAMUSCULAR; INTRAVENOUS; SUBCUTANEOUS ONCE
Qty: 0 | Refills: 0 | Status: COMPLETED | OUTPATIENT
Start: 2017-09-16 | End: 2017-09-16

## 2017-09-16 RX ORDER — PANTOPRAZOLE SODIUM 20 MG/1
40 TABLET, DELAYED RELEASE ORAL ONCE
Qty: 0 | Refills: 0 | Status: COMPLETED | OUTPATIENT
Start: 2017-09-16 | End: 2017-09-16

## 2017-09-16 RX ORDER — SODIUM CHLORIDE 9 MG/ML
1000 INJECTION INTRAMUSCULAR; INTRAVENOUS; SUBCUTANEOUS ONCE
Qty: 0 | Refills: 0 | Status: COMPLETED | OUTPATIENT
Start: 2017-09-16 | End: 2017-09-16

## 2017-09-16 NOTE — ED ADULT NURSE NOTE - OBJECTIVE STATEMENT
Patient A&Ox4, complaining of generalized abdominal pain that began suddenly yesterday. Stated was at home when pain came on. Also complaining of nausea. Patient stated had a hysterectomy at [facility] in July 2017. Stated pain feels similar to the pain she had when she had fibroids. Denies any chest pain, shortness of breath, or dizziness. Respirations even & unlabored, denies any numbness or tingling. Abdomen soft, tender upon palpation x4. Positive bowel sounds x4. LBM: 9/15/17, describes as regular. Denies any dysuria. Last PO intake 6/16/17 at appx 2130.

## 2017-09-16 NOTE — ED PROVIDER NOTE - PROGRESS NOTE DETAILS
CT results as noted.  Pt states pain improved at this time and she has been able to tolerate po without recurrent pain or vomiting.  Pt stable for d/c with f/u as outpt

## 2017-09-16 NOTE — ED PROVIDER NOTE - OBJECTIVE STATEMENT
40 y/o F with hx of fibroids, hysterectomy () presents to the ED c/o pinching abdominal pain that began yesterday. Pt rates her pain as 10/10 and states she feels pain diffusely throughout her abdomen. She endorses associated nausea/vomiting (5 episodes), vomit consisted of water and ingested food. Pt states her pain began this morning and is exacerbated with PO intake. Pt was able to eat rice and drink tea earlier today. Denies vaginal bleeding, diarrhea, abdominal distension, constipation, fever, chills. Pt has been passing gas normally, last normal BM yesterday. . No further complaints at this time.

## 2017-09-17 VITALS
HEART RATE: 70 BPM | OXYGEN SATURATION: 100 % | SYSTOLIC BLOOD PRESSURE: 112 MMHG | TEMPERATURE: 98 F | RESPIRATION RATE: 16 BRPM | DIASTOLIC BLOOD PRESSURE: 72 MMHG

## 2017-09-17 LAB
ALBUMIN SERPL ELPH-MCNC: 4.5 G/DL — SIGNIFICANT CHANGE UP (ref 3.3–5.2)
ALP SERPL-CCNC: 86 U/L — SIGNIFICANT CHANGE UP (ref 40–120)
ALT FLD-CCNC: 31 U/L — SIGNIFICANT CHANGE UP
ANION GAP SERPL CALC-SCNC: 10 MMOL/L — SIGNIFICANT CHANGE UP (ref 5–17)
ANISOCYTOSIS BLD QL: SLIGHT — SIGNIFICANT CHANGE UP
AST SERPL-CCNC: 28 U/L — SIGNIFICANT CHANGE UP
BASOPHILS # BLD AUTO: 0 K/UL — SIGNIFICANT CHANGE UP (ref 0–0.2)
BASOPHILS NFR BLD AUTO: 0.1 % — SIGNIFICANT CHANGE UP (ref 0–2)
BILIRUB SERPL-MCNC: 0.2 MG/DL — LOW (ref 0.4–2)
BUN SERPL-MCNC: 11 MG/DL — SIGNIFICANT CHANGE UP (ref 8–20)
CALCIUM SERPL-MCNC: 9.6 MG/DL — SIGNIFICANT CHANGE UP (ref 8.6–10.2)
CHLORIDE SERPL-SCNC: 102 MMOL/L — SIGNIFICANT CHANGE UP (ref 98–107)
CO2 SERPL-SCNC: 26 MMOL/L — SIGNIFICANT CHANGE UP (ref 22–29)
CREAT SERPL-MCNC: 0.57 MG/DL — SIGNIFICANT CHANGE UP (ref 0.5–1.3)
DACRYOCYTES BLD QL SMEAR: SLIGHT — SIGNIFICANT CHANGE UP
ELLIPTOCYTES BLD QL SMEAR: SLIGHT — SIGNIFICANT CHANGE UP
EOSINOPHIL # BLD AUTO: 0.1 K/UL — SIGNIFICANT CHANGE UP (ref 0–0.5)
EOSINOPHIL NFR BLD AUTO: 0.7 % — SIGNIFICANT CHANGE UP (ref 0–6)
GLUCOSE SERPL-MCNC: 97 MG/DL — SIGNIFICANT CHANGE UP (ref 70–115)
HCT VFR BLD CALC: 32 % — LOW (ref 37–47)
HGB BLD-MCNC: 9.9 G/DL — LOW (ref 12–16)
HYPOCHROMIA BLD QL: SIGNIFICANT CHANGE UP
LG PLATELETS BLD QL AUTO: SLIGHT — SIGNIFICANT CHANGE UP
LIDOCAIN IGE QN: 56 U/L — HIGH (ref 22–51)
LYMPHOCYTES # BLD AUTO: 2.1 K/UL — SIGNIFICANT CHANGE UP (ref 1–4.8)
LYMPHOCYTES # BLD AUTO: 23.1 % — SIGNIFICANT CHANGE UP (ref 20–55)
MACROCYTES BLD QL: SLIGHT — SIGNIFICANT CHANGE UP
MCHC RBC-ENTMCNC: 19.8 PG — LOW (ref 27–31)
MCHC RBC-ENTMCNC: 30.9 G/DL — LOW (ref 32–36)
MCV RBC AUTO: 64 FL — LOW (ref 81–99)
MICROCYTES BLD QL: SLIGHT — SIGNIFICANT CHANGE UP
MONOCYTES # BLD AUTO: 0.7 K/UL — SIGNIFICANT CHANGE UP (ref 0–0.8)
MONOCYTES NFR BLD AUTO: 8.1 % — SIGNIFICANT CHANGE UP (ref 3–10)
NEUTROPHILS # BLD AUTO: 6 K/UL — SIGNIFICANT CHANGE UP (ref 1.8–8)
NEUTROPHILS NFR BLD AUTO: 67.9 % — SIGNIFICANT CHANGE UP (ref 37–73)
OVALOCYTES BLD QL SMEAR: SLIGHT — SIGNIFICANT CHANGE UP
PLAT MORPH BLD: SIGNIFICANT CHANGE UP
PLATELET # BLD AUTO: 315 K/UL — SIGNIFICANT CHANGE UP (ref 150–400)
POIKILOCYTOSIS BLD QL AUTO: SLIGHT — SIGNIFICANT CHANGE UP
POLYCHROMASIA BLD QL SMEAR: SLIGHT — SIGNIFICANT CHANGE UP
POTASSIUM SERPL-MCNC: 4 MMOL/L — SIGNIFICANT CHANGE UP (ref 3.5–5.3)
POTASSIUM SERPL-SCNC: 4 MMOL/L — SIGNIFICANT CHANGE UP (ref 3.5–5.3)
PROT SERPL-MCNC: 8.4 G/DL — SIGNIFICANT CHANGE UP (ref 6.6–8.7)
RBC # BLD: 5 M/UL — SIGNIFICANT CHANGE UP (ref 4.4–5.2)
RBC # FLD: 20.3 % — HIGH (ref 11–15.6)
RBC BLD AUTO: ABNORMAL
SCHISTOCYTES BLD QL AUTO: SLIGHT — SIGNIFICANT CHANGE UP
SODIUM SERPL-SCNC: 138 MMOL/L — SIGNIFICANT CHANGE UP (ref 135–145)
TARGETS BLD QL SMEAR: SLIGHT — SIGNIFICANT CHANGE UP
WBC # BLD: 8.9 K/UL — SIGNIFICANT CHANGE UP (ref 4.8–10.8)
WBC # FLD AUTO: 8.9 K/UL — SIGNIFICANT CHANGE UP (ref 4.8–10.8)

## 2017-09-17 PROCEDURE — 83690 ASSAY OF LIPASE: CPT

## 2017-09-17 PROCEDURE — 96375 TX/PRO/DX INJ NEW DRUG ADDON: CPT

## 2017-09-17 PROCEDURE — 85027 COMPLETE CBC AUTOMATED: CPT

## 2017-09-17 PROCEDURE — 80053 COMPREHEN METABOLIC PANEL: CPT

## 2017-09-17 PROCEDURE — 74177 CT ABD & PELVIS W/CONTRAST: CPT | Mod: 26

## 2017-09-17 PROCEDURE — 96374 THER/PROPH/DIAG INJ IV PUSH: CPT | Mod: XU

## 2017-09-17 PROCEDURE — 36415 COLL VENOUS BLD VENIPUNCTURE: CPT

## 2017-09-17 PROCEDURE — 74177 CT ABD & PELVIS W/CONTRAST: CPT

## 2017-09-17 PROCEDURE — 99284 EMERGENCY DEPT VISIT MOD MDM: CPT | Mod: 25

## 2017-09-17 RX ADMIN — ONDANSETRON 4 MILLIGRAM(S): 8 TABLET, FILM COATED ORAL at 00:32

## 2017-09-17 RX ADMIN — SODIUM CHLORIDE 3 MILLILITER(S): 9 INJECTION INTRAMUSCULAR; INTRAVENOUS; SUBCUTANEOUS at 00:27

## 2017-09-17 RX ADMIN — SODIUM CHLORIDE 1000 MILLILITER(S): 9 INJECTION INTRAMUSCULAR; INTRAVENOUS; SUBCUTANEOUS at 00:27

## 2017-09-17 RX ADMIN — PANTOPRAZOLE SODIUM 40 MILLIGRAM(S): 20 TABLET, DELAYED RELEASE ORAL at 00:26

## 2017-11-28 ENCOUNTER — RECORD ABSTRACTING (OUTPATIENT)
Age: 42
End: 2017-11-28

## 2017-11-28 DIAGNOSIS — Z86.018 PERSONAL HISTORY OF OTHER BENIGN NEOPLASM: ICD-10-CM

## 2017-11-30 ENCOUNTER — APPOINTMENT (OUTPATIENT)
Dept: OBGYN | Facility: CLINIC | Age: 42
End: 2017-11-30

## 2018-02-08 NOTE — CONSULT NOTE ADULT - PROBLEM SELECTOR PROBLEM 4
Patient c/o abscess to right buttocks, left chin, and by left of bridge of nose. 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 Patient c/o "pink, thick" discharge from buttocks. Patient denies any fevers, c/o chills. Denies any other PMHx. Anemia, unspecified type

## 2018-10-01 ENCOUNTER — EMERGENCY (EMERGENCY)
Facility: HOSPITAL | Age: 43
LOS: 1 days | Discharge: HOME | End: 2018-10-01
Attending: EMERGENCY MEDICINE
Payer: MEDICAID

## 2018-10-01 VITALS
TEMPERATURE: 99 F | DIASTOLIC BLOOD PRESSURE: 78 MMHG | SYSTOLIC BLOOD PRESSURE: 117 MMHG | OXYGEN SATURATION: 100 % | RESPIRATION RATE: 16 BRPM | HEART RATE: 76 BPM

## 2018-10-01 DIAGNOSIS — Z98.89 OTHER SPECIFIED POSTPROCEDURAL STATES: Chronic | ICD-10-CM

## 2018-10-01 DIAGNOSIS — O03.9 COMPLETE OR UNSPECIFIED SPONTANEOUS ABORTION WITHOUT COMPLICATION: Chronic | ICD-10-CM

## 2018-10-01 DIAGNOSIS — Z98.51 TUBAL LIGATION STATUS: Chronic | ICD-10-CM

## 2018-10-01 DIAGNOSIS — Z98.890 OTHER SPECIFIED POSTPROCEDURAL STATES: Chronic | ICD-10-CM

## 2018-10-01 PROCEDURE — 99283 EMERGENCY DEPT VISIT LOW MDM: CPT

## 2018-10-01 PROCEDURE — 99283 EMERGENCY DEPT VISIT LOW MDM: CPT | Mod: 25

## 2018-10-01 PROCEDURE — 96372 THER/PROPH/DIAG INJ SC/IM: CPT

## 2018-10-01 RX ORDER — DEXAMETHASONE 0.5 MG/5ML
10 ELIXIR ORAL ONCE
Qty: 0 | Refills: 0 | Status: COMPLETED | OUTPATIENT
Start: 2018-10-01 | End: 2018-10-01

## 2018-10-01 RX ORDER — ACETAMINOPHEN 500 MG
650 TABLET ORAL ONCE
Qty: 0 | Refills: 0 | Status: COMPLETED | OUTPATIENT
Start: 2018-10-01 | End: 2018-10-01

## 2018-10-01 RX ADMIN — Medication 10 MILLIGRAM(S): at 23:47

## 2018-10-01 RX ADMIN — Medication 650 MILLIGRAM(S): at 23:47

## 2018-10-01 NOTE — ED STATDOCS - MEDICAL DECISION MAKING DETAILS
Patient with URI infection, headaches, relieved with Tylenol, likely viral syndrome, will give Tylenol and Decadron for pharyngitis.

## 2018-10-01 NOTE — ED STATDOCS - ENMT, MLM
Nasal mucosa clear.  Mouth with normal mucosa. patient is uncooperative so unable to observe posterior pharynx

## 2018-10-02 PROBLEM — D25.9 LEIOMYOMA OF UTERUS, UNSPECIFIED: Chronic | Status: ACTIVE | Noted: 2017-07-18

## 2018-10-02 PROBLEM — D64.9 ANEMIA, UNSPECIFIED: Chronic | Status: ACTIVE | Noted: 2017-05-03

## 2019-05-02 ENCOUNTER — TRANSCRIPTION ENCOUNTER (OUTPATIENT)
Age: 44
End: 2019-05-02

## 2019-05-07 ENCOUNTER — APPOINTMENT (OUTPATIENT)
Dept: GASTROENTEROLOGY | Facility: CLINIC | Age: 44
End: 2019-05-07

## 2019-10-16 ENCOUNTER — EMERGENCY (EMERGENCY)
Facility: HOSPITAL | Age: 44
LOS: 1 days | Discharge: HOME | End: 2019-10-16
Attending: STUDENT IN AN ORGANIZED HEALTH CARE EDUCATION/TRAINING PROGRAM
Payer: MEDICAID

## 2019-10-16 VITALS
TEMPERATURE: 98 F | HEIGHT: 64 IN | WEIGHT: 145.06 LBS | SYSTOLIC BLOOD PRESSURE: 113 MMHG | DIASTOLIC BLOOD PRESSURE: 68 MMHG | HEART RATE: 76 BPM | RESPIRATION RATE: 20 BRPM | OXYGEN SATURATION: 100 %

## 2019-10-16 DIAGNOSIS — Z98.51 TUBAL LIGATION STATUS: Chronic | ICD-10-CM

## 2019-10-16 DIAGNOSIS — O03.9 COMPLETE OR UNSPECIFIED SPONTANEOUS ABORTION WITHOUT COMPLICATION: Chronic | ICD-10-CM

## 2019-10-16 DIAGNOSIS — Z98.89 OTHER SPECIFIED POSTPROCEDURAL STATES: Chronic | ICD-10-CM

## 2019-10-16 DIAGNOSIS — Z98.890 OTHER SPECIFIED POSTPROCEDURAL STATES: Chronic | ICD-10-CM

## 2019-10-16 PROCEDURE — 99284 EMERGENCY DEPT VISIT MOD MDM: CPT

## 2019-10-16 NOTE — ED ADULT TRIAGE NOTE - CHIEF COMPLAINT QUOTE
Generalized abdominal pain since yesterday, now with nausea.  denies cardiac history, denies diarrhea, denies fevers.  Hx of same pain but does not remember what diagnosis was.

## 2019-10-17 VITALS
RESPIRATION RATE: 20 BRPM | DIASTOLIC BLOOD PRESSURE: 84 MMHG | HEART RATE: 61 BPM | TEMPERATURE: 98 F | OXYGEN SATURATION: 100 % | SYSTOLIC BLOOD PRESSURE: 123 MMHG

## 2019-10-17 LAB
ALBUMIN SERPL ELPH-MCNC: 4 G/DL — SIGNIFICANT CHANGE UP (ref 3.3–5.2)
ALP SERPL-CCNC: 85 U/L — SIGNIFICANT CHANGE UP (ref 40–120)
ALT FLD-CCNC: 21 U/L — SIGNIFICANT CHANGE UP
ANION GAP SERPL CALC-SCNC: 10 MMOL/L — SIGNIFICANT CHANGE UP (ref 5–17)
APPEARANCE UR: CLEAR — SIGNIFICANT CHANGE UP
AST SERPL-CCNC: 23 U/L — SIGNIFICANT CHANGE UP
BASOPHILS # BLD AUTO: 0.01 K/UL — SIGNIFICANT CHANGE UP (ref 0–0.2)
BASOPHILS NFR BLD AUTO: 0.1 % — SIGNIFICANT CHANGE UP (ref 0–2)
BILIRUB SERPL-MCNC: <0.2 MG/DL — LOW (ref 0.4–2)
BILIRUB UR-MCNC: NEGATIVE — SIGNIFICANT CHANGE UP
BUN SERPL-MCNC: 9 MG/DL — SIGNIFICANT CHANGE UP (ref 8–20)
CALCIUM SERPL-MCNC: 8.9 MG/DL — SIGNIFICANT CHANGE UP (ref 8.6–10.2)
CHLORIDE SERPL-SCNC: 106 MMOL/L — SIGNIFICANT CHANGE UP (ref 98–107)
CO2 SERPL-SCNC: 24 MMOL/L — SIGNIFICANT CHANGE UP (ref 22–29)
COLOR SPEC: YELLOW — SIGNIFICANT CHANGE UP
CREAT SERPL-MCNC: 0.58 MG/DL — SIGNIFICANT CHANGE UP (ref 0.5–1.3)
DIFF PNL FLD: NEGATIVE — SIGNIFICANT CHANGE UP
EOSINOPHIL # BLD AUTO: 0.09 K/UL — SIGNIFICANT CHANGE UP (ref 0–0.5)
EOSINOPHIL NFR BLD AUTO: 1.3 % — SIGNIFICANT CHANGE UP (ref 0–6)
GLUCOSE SERPL-MCNC: 102 MG/DL — SIGNIFICANT CHANGE UP (ref 70–115)
GLUCOSE UR QL: NEGATIVE MG/DL — SIGNIFICANT CHANGE UP
HCT VFR BLD CALC: 40.7 % — SIGNIFICANT CHANGE UP (ref 34.5–45)
HGB BLD-MCNC: 12.8 G/DL — SIGNIFICANT CHANGE UP (ref 11.5–15.5)
IMM GRANULOCYTES NFR BLD AUTO: 0.1 % — SIGNIFICANT CHANGE UP (ref 0–1.5)
KETONES UR-MCNC: NEGATIVE — SIGNIFICANT CHANGE UP
LACTATE BLDV-MCNC: 1.2 MMOL/L — SIGNIFICANT CHANGE UP (ref 0.5–2)
LEUKOCYTE ESTERASE UR-ACNC: NEGATIVE — SIGNIFICANT CHANGE UP
LIDOCAIN IGE QN: 45 U/L — SIGNIFICANT CHANGE UP (ref 22–51)
LYMPHOCYTES # BLD AUTO: 1.67 K/UL — SIGNIFICANT CHANGE UP (ref 1–3.3)
LYMPHOCYTES # BLD AUTO: 23.8 % — SIGNIFICANT CHANGE UP (ref 13–44)
MAGNESIUM SERPL-MCNC: 2 MG/DL — SIGNIFICANT CHANGE UP (ref 1.6–2.6)
MCHC RBC-ENTMCNC: 27 PG — SIGNIFICANT CHANGE UP (ref 27–34)
MCHC RBC-ENTMCNC: 31.4 GM/DL — LOW (ref 32–36)
MCV RBC AUTO: 85.9 FL — SIGNIFICANT CHANGE UP (ref 80–100)
MONOCYTES # BLD AUTO: 0.6 K/UL — SIGNIFICANT CHANGE UP (ref 0–0.9)
MONOCYTES NFR BLD AUTO: 8.5 % — SIGNIFICANT CHANGE UP (ref 2–14)
NEUTROPHILS # BLD AUTO: 4.64 K/UL — SIGNIFICANT CHANGE UP (ref 1.8–7.4)
NEUTROPHILS NFR BLD AUTO: 66.2 % — SIGNIFICANT CHANGE UP (ref 43–77)
NITRITE UR-MCNC: NEGATIVE — SIGNIFICANT CHANGE UP
PH UR: 6.5 — SIGNIFICANT CHANGE UP (ref 5–8)
PLATELET # BLD AUTO: 160 K/UL — SIGNIFICANT CHANGE UP (ref 150–400)
POTASSIUM SERPL-MCNC: 4.9 MMOL/L — SIGNIFICANT CHANGE UP (ref 3.5–5.3)
POTASSIUM SERPL-SCNC: 4.9 MMOL/L — SIGNIFICANT CHANGE UP (ref 3.5–5.3)
PROT SERPL-MCNC: 7.3 G/DL — SIGNIFICANT CHANGE UP (ref 6.6–8.7)
PROT UR-MCNC: NEGATIVE MG/DL — SIGNIFICANT CHANGE UP
RBC # BLD: 4.74 M/UL — SIGNIFICANT CHANGE UP (ref 3.8–5.2)
RBC # FLD: 13.5 % — SIGNIFICANT CHANGE UP (ref 10.3–14.5)
SODIUM SERPL-SCNC: 140 MMOL/L — SIGNIFICANT CHANGE UP (ref 135–145)
SP GR SPEC: 1.01 — SIGNIFICANT CHANGE UP (ref 1.01–1.02)
UROBILINOGEN FLD QL: NEGATIVE MG/DL — SIGNIFICANT CHANGE UP
WBC # BLD: 7.02 K/UL — SIGNIFICANT CHANGE UP (ref 3.8–10.5)
WBC # FLD AUTO: 7.02 K/UL — SIGNIFICANT CHANGE UP (ref 3.8–10.5)

## 2019-10-17 PROCEDURE — 36415 COLL VENOUS BLD VENIPUNCTURE: CPT

## 2019-10-17 PROCEDURE — 74177 CT ABD & PELVIS W/CONTRAST: CPT | Mod: 26

## 2019-10-17 PROCEDURE — 74177 CT ABD & PELVIS W/CONTRAST: CPT

## 2019-10-17 PROCEDURE — 83690 ASSAY OF LIPASE: CPT

## 2019-10-17 PROCEDURE — 85027 COMPLETE CBC AUTOMATED: CPT

## 2019-10-17 PROCEDURE — 83605 ASSAY OF LACTIC ACID: CPT

## 2019-10-17 PROCEDURE — 93005 ELECTROCARDIOGRAM TRACING: CPT

## 2019-10-17 PROCEDURE — 93010 ELECTROCARDIOGRAM REPORT: CPT

## 2019-10-17 PROCEDURE — 96374 THER/PROPH/DIAG INJ IV PUSH: CPT | Mod: XU

## 2019-10-17 PROCEDURE — 83735 ASSAY OF MAGNESIUM: CPT

## 2019-10-17 PROCEDURE — 99284 EMERGENCY DEPT VISIT MOD MDM: CPT | Mod: 25

## 2019-10-17 PROCEDURE — 96375 TX/PRO/DX INJ NEW DRUG ADDON: CPT

## 2019-10-17 PROCEDURE — 81003 URINALYSIS AUTO W/O SCOPE: CPT

## 2019-10-17 PROCEDURE — 80053 COMPREHEN METABOLIC PANEL: CPT

## 2019-10-17 RX ORDER — KETOROLAC TROMETHAMINE 30 MG/ML
15 SYRINGE (ML) INJECTION ONCE
Refills: 0 | Status: DISCONTINUED | OUTPATIENT
Start: 2019-10-17 | End: 2019-10-17

## 2019-10-17 RX ORDER — SODIUM CHLORIDE 9 MG/ML
1000 INJECTION INTRAMUSCULAR; INTRAVENOUS; SUBCUTANEOUS ONCE
Refills: 0 | Status: COMPLETED | OUTPATIENT
Start: 2019-10-17 | End: 2019-10-17

## 2019-10-17 RX ORDER — ONDANSETRON 8 MG/1
4 TABLET, FILM COATED ORAL ONCE
Refills: 0 | Status: COMPLETED | OUTPATIENT
Start: 2019-10-17 | End: 2019-10-17

## 2019-10-17 RX ADMIN — ONDANSETRON 4 MILLIGRAM(S): 8 TABLET, FILM COATED ORAL at 01:36

## 2019-10-17 RX ADMIN — SODIUM CHLORIDE 1000 MILLILITER(S): 9 INJECTION INTRAMUSCULAR; INTRAVENOUS; SUBCUTANEOUS at 01:37

## 2019-10-17 RX ADMIN — Medication 15 MILLIGRAM(S): at 01:37

## 2019-10-17 NOTE — ED PROVIDER NOTE - PATIENT PORTAL LINK FT
You can access the FollowMyHealth Patient Portal offered by [facility] by registering at the following website: [email or web address]. By joining [employer]’s FollowMyHealth portal, you will also be able to view your health information using other applications (apps) compatible with our system.

## 2019-10-17 NOTE — ED PROVIDER NOTE - OBJECTIVE STATEMENT
43 year old F pt with past medical history significant for anemia, uterine fibroids, 4 C-sections, hysterectomy presents to the ED c/o worsening abdominal pain beginning 2 days ago. Pain is described as a squeezing, cramping pain. Associated vomiting. NKDA. Nonsmoker, denies ETOH, denies any other illicit drug use. Pt took 2 Tylenol at 1900 without any relief. Pt has had similar pain before, last 2 years ago. No history of gallstones. Pain is relieved slightly with eating and deep inspiration. Pt denies fevers/chills, ha, loc, focal neuro deficits, cp/sob/palp, cough, v/d, vaginal bleeding or discharge, urinary symptoms, recent travel and sick contacts. No further complaints at this time.

## 2019-10-17 NOTE — ED PROVIDER NOTE - CLINICAL SUMMARY MEDICAL DECISION MAKING FREE TEXT BOX
43 year old F pt with past medical history significant for anemia, uterine fibroids, 4 C-sections, hysterectomy presents to the ED c/o worsening abdominal pain beginning 2 days ago. 43 year old F pt with past medical history significant for anemia, uterine fibroids, 4 C-sections, hysterectomy presents to the ED c/o worsening abdominal pain beginning 2 days ago - pt with no acute findings on ctap, labs wnl pt likely with pud pain better with food (doudenal?), symptoms resolved with meds, follow up with pmd and GI

## 2019-11-14 NOTE — DISCHARGE NOTE ADULT - PAIN PRESENT
Helped pt fill out Access [employer] application. Pending support letter to complete financial screening. No

## 2019-12-01 NOTE — ASU PREOP CHECKLIST - WARM FLUIDS/WARM BLANKETS
Patient verbally informed that body search would be performed to  remove any items that may be potentially used for self harm or suicidal behavior, ensure that no potentially dangerous mind or mood altering drugs were being introduced into treatment environment, remove any items that may pose a risk for personal safety due to thought or mood disorder and advised about what would occur during the search process.  Patient denies being in possession of potentially dangerous items.  The patient was provided with an opportunity to ask questions or voice any concerns related to the body surface search prior to it being performed.  The patient agreed to participate in the search process.  Body surface search was conducted by two staff members: (Wayne RN and CHEL Connelly).  Patient response to search process was Cooperative with no adverse physical or psychological response.  Contraband was not found during the search process.     Pt's belongings, as listed below, sent to [facility]:    Cell Phone, ear buds, lighter, , connector, black/string necklace with blue-colored glass pendant, wallet, large amount of miscellaneous cards, several [place] IDs and DL's, $20.00 USD, coins.   yes

## 2019-12-30 NOTE — ED ADULT TRIAGE NOTE - LOCATION:
Diet: Patient instructed regarding a heart healthy diet, including discussion of reduced fat and sodium intake. Patient demonstrated understanding of this information and agreed to call with further questions or concerns.  Return Appointment: Patient given instructions regarding scheduling next clinic visit. Patient demonstrated understanding of this information and agreed to call with further questions or concerns.  Medication Change: Patient was educated regarding prescribed medication change, including discussion of the indication, administration, side effects, and when to report to MD or RN. Patient demonstrated understanding of this information and agreed to call with further questions or concerns.  Patient stated she understood all health information given and agreed to call with further questions or concerns.  Heaven Puentes RN Care Coordinator  Heart Failure CORE     Left arm;

## 2020-01-17 ENCOUNTER — TRANSCRIPTION ENCOUNTER (OUTPATIENT)
Age: 45
End: 2020-01-17

## 2020-02-04 NOTE — ED STATDOCS - OBJECTIVE STATEMENT
pcp
41 y/o F with PSHx of  x4, hysterectomy and fibroid removal presents to ED c/o headache and dizziness onset 3 days ago. States she has had headaches in the past, and they normally go away when she takes Tylenol but today it did not. Notes she has had a cold recently (cough, runny nose and throat pain). Last dose of OTC medication was yesterday at 1400. No sick contacts at home. Denies fevers, chills, abdominal pain, nausea, vomiting, diarrhea or visual changes. NKDA. No further acute complaints at this time.

## 2020-05-05 ENCOUNTER — APPOINTMENT (OUTPATIENT)
Dept: OBGYN | Facility: CLINIC | Age: 45
End: 2020-05-05
Payer: COMMERCIAL

## 2020-05-05 ENCOUNTER — EMERGENCY (EMERGENCY)
Facility: HOSPITAL | Age: 45
LOS: 1 days | Discharge: HOME | End: 2020-05-05
Attending: EMERGENCY MEDICINE
Payer: COMMERCIAL

## 2020-05-05 VITALS
SYSTOLIC BLOOD PRESSURE: 119 MMHG | RESPIRATION RATE: 18 BRPM | DIASTOLIC BLOOD PRESSURE: 76 MMHG | OXYGEN SATURATION: 100 % | HEIGHT: 63 IN | HEART RATE: 71 BPM | TEMPERATURE: 99 F | WEIGHT: 160.06 LBS

## 2020-05-05 VITALS — WEIGHT: 160.25 LBS | DIASTOLIC BLOOD PRESSURE: 83 MMHG | SYSTOLIC BLOOD PRESSURE: 125 MMHG

## 2020-05-05 DIAGNOSIS — O03.9 COMPLETE OR UNSPECIFIED SPONTANEOUS ABORTION WITHOUT COMPLICATION: Chronic | ICD-10-CM

## 2020-05-05 DIAGNOSIS — Z98.890 OTHER SPECIFIED POSTPROCEDURAL STATES: Chronic | ICD-10-CM

## 2020-05-05 DIAGNOSIS — Z98.51 TUBAL LIGATION STATUS: Chronic | ICD-10-CM

## 2020-05-05 DIAGNOSIS — Z78.9 OTHER SPECIFIED HEALTH STATUS: ICD-10-CM

## 2020-05-05 DIAGNOSIS — G89.29 GENERALIZED ABDOMINAL PAIN: ICD-10-CM

## 2020-05-05 DIAGNOSIS — R10.84 GENERALIZED ABDOMINAL PAIN: ICD-10-CM

## 2020-05-05 DIAGNOSIS — Z98.89 OTHER SPECIFIED POSTPROCEDURAL STATES: Chronic | ICD-10-CM

## 2020-05-05 LAB
ALBUMIN SERPL ELPH-MCNC: 4.6 G/DL — SIGNIFICANT CHANGE UP (ref 3.3–5.2)
ALP SERPL-CCNC: 90 U/L — SIGNIFICANT CHANGE UP (ref 40–120)
ALT FLD-CCNC: 23 U/L — SIGNIFICANT CHANGE UP
ANION GAP SERPL CALC-SCNC: 9 MMOL/L — SIGNIFICANT CHANGE UP (ref 5–17)
APPEARANCE UR: CLEAR — SIGNIFICANT CHANGE UP
AST SERPL-CCNC: 22 U/L — SIGNIFICANT CHANGE UP
BASOPHILS # BLD AUTO: 0.02 K/UL — SIGNIFICANT CHANGE UP (ref 0–0.2)
BASOPHILS NFR BLD AUTO: 0.2 % — SIGNIFICANT CHANGE UP (ref 0–2)
BILIRUB SERPL-MCNC: 0.3 MG/DL — LOW (ref 0.4–2)
BILIRUB UR-MCNC: NEGATIVE — SIGNIFICANT CHANGE UP
BUN SERPL-MCNC: 7 MG/DL — LOW (ref 8–20)
CALCIUM SERPL-MCNC: 9.7 MG/DL — SIGNIFICANT CHANGE UP (ref 8.6–10.2)
CHLORIDE SERPL-SCNC: 100 MMOL/L — SIGNIFICANT CHANGE UP (ref 98–107)
CO2 SERPL-SCNC: 28 MMOL/L — SIGNIFICANT CHANGE UP (ref 22–29)
COLOR SPEC: YELLOW — SIGNIFICANT CHANGE UP
CREAT SERPL-MCNC: 0.53 MG/DL — SIGNIFICANT CHANGE UP (ref 0.5–1.3)
DIFF PNL FLD: NEGATIVE — SIGNIFICANT CHANGE UP
EOSINOPHIL # BLD AUTO: 0.09 K/UL — SIGNIFICANT CHANGE UP (ref 0–0.5)
EOSINOPHIL NFR BLD AUTO: 0.8 % — SIGNIFICANT CHANGE UP (ref 0–6)
GLUCOSE SERPL-MCNC: 85 MG/DL — SIGNIFICANT CHANGE UP (ref 70–99)
GLUCOSE UR QL: NEGATIVE MG/DL — SIGNIFICANT CHANGE UP
HCG SERPL-ACNC: <4 MIU/ML — SIGNIFICANT CHANGE UP
HCT VFR BLD CALC: 42.6 % — SIGNIFICANT CHANGE UP (ref 34.5–45)
HGB BLD-MCNC: 13.7 G/DL — SIGNIFICANT CHANGE UP (ref 11.5–15.5)
IMM GRANULOCYTES NFR BLD AUTO: 0.4 % — SIGNIFICANT CHANGE UP (ref 0–1.5)
KETONES UR-MCNC: NEGATIVE — SIGNIFICANT CHANGE UP
LEUKOCYTE ESTERASE UR-ACNC: NEGATIVE — SIGNIFICANT CHANGE UP
LIDOCAIN IGE QN: 26 U/L — SIGNIFICANT CHANGE UP (ref 22–51)
LYMPHOCYTES # BLD AUTO: 1.9 K/UL — SIGNIFICANT CHANGE UP (ref 1–3.3)
LYMPHOCYTES # BLD AUTO: 16.7 % — SIGNIFICANT CHANGE UP (ref 13–44)
MCHC RBC-ENTMCNC: 27.7 PG — SIGNIFICANT CHANGE UP (ref 27–34)
MCHC RBC-ENTMCNC: 32.2 GM/DL — SIGNIFICANT CHANGE UP (ref 32–36)
MCV RBC AUTO: 86.2 FL — SIGNIFICANT CHANGE UP (ref 80–100)
MONOCYTES # BLD AUTO: 0.73 K/UL — SIGNIFICANT CHANGE UP (ref 0–0.9)
MONOCYTES NFR BLD AUTO: 6.4 % — SIGNIFICANT CHANGE UP (ref 2–14)
NEUTROPHILS # BLD AUTO: 8.63 K/UL — HIGH (ref 1.8–7.4)
NEUTROPHILS NFR BLD AUTO: 75.5 % — SIGNIFICANT CHANGE UP (ref 43–77)
NITRITE UR-MCNC: NEGATIVE — SIGNIFICANT CHANGE UP
PH UR: 6 — SIGNIFICANT CHANGE UP (ref 5–8)
PLATELET # BLD AUTO: 259 K/UL — SIGNIFICANT CHANGE UP (ref 150–400)
POTASSIUM SERPL-MCNC: 4.5 MMOL/L — SIGNIFICANT CHANGE UP (ref 3.5–5.3)
POTASSIUM SERPL-SCNC: 4.5 MMOL/L — SIGNIFICANT CHANGE UP (ref 3.5–5.3)
PROT SERPL-MCNC: 8.3 G/DL — SIGNIFICANT CHANGE UP (ref 6.6–8.7)
PROT UR-MCNC: NEGATIVE MG/DL — SIGNIFICANT CHANGE UP
RBC # BLD: 4.94 M/UL — SIGNIFICANT CHANGE UP (ref 3.8–5.2)
RBC # FLD: 13.4 % — SIGNIFICANT CHANGE UP (ref 10.3–14.5)
SODIUM SERPL-SCNC: 137 MMOL/L — SIGNIFICANT CHANGE UP (ref 135–145)
SP GR SPEC: 1.01 — SIGNIFICANT CHANGE UP (ref 1.01–1.02)
UROBILINOGEN FLD QL: NEGATIVE MG/DL — SIGNIFICANT CHANGE UP
WBC # BLD: 11.41 K/UL — HIGH (ref 3.8–10.5)
WBC # FLD AUTO: 11.41 K/UL — HIGH (ref 3.8–10.5)

## 2020-05-05 PROCEDURE — 96374 THER/PROPH/DIAG INJ IV PUSH: CPT | Mod: XU

## 2020-05-05 PROCEDURE — 36415 COLL VENOUS BLD VENIPUNCTURE: CPT

## 2020-05-05 PROCEDURE — 84702 CHORIONIC GONADOTROPIN TEST: CPT

## 2020-05-05 PROCEDURE — 81003 URINALYSIS AUTO W/O SCOPE: CPT

## 2020-05-05 PROCEDURE — 99285 EMERGENCY DEPT VISIT HI MDM: CPT

## 2020-05-05 PROCEDURE — 85027 COMPLETE CBC AUTOMATED: CPT

## 2020-05-05 PROCEDURE — 83690 ASSAY OF LIPASE: CPT

## 2020-05-05 PROCEDURE — 87086 URINE CULTURE/COLONY COUNT: CPT

## 2020-05-05 PROCEDURE — 76856 US EXAM PELVIC COMPLETE: CPT

## 2020-05-05 PROCEDURE — 74177 CT ABD & PELVIS W/CONTRAST: CPT | Mod: 26

## 2020-05-05 PROCEDURE — 74177 CT ABD & PELVIS W/CONTRAST: CPT

## 2020-05-05 PROCEDURE — 96375 TX/PRO/DX INJ NEW DRUG ADDON: CPT

## 2020-05-05 PROCEDURE — 99204 OFFICE O/P NEW MOD 45 MIN: CPT

## 2020-05-05 PROCEDURE — 76830 TRANSVAGINAL US NON-OB: CPT

## 2020-05-05 PROCEDURE — 76705 ECHO EXAM OF ABDOMEN: CPT

## 2020-05-05 PROCEDURE — 76830 TRANSVAGINAL US NON-OB: CPT | Mod: 26

## 2020-05-05 PROCEDURE — 76705 ECHO EXAM OF ABDOMEN: CPT | Mod: 26

## 2020-05-05 PROCEDURE — 76856 US EXAM PELVIC COMPLETE: CPT | Mod: 26

## 2020-05-05 PROCEDURE — 99284 EMERGENCY DEPT VISIT MOD MDM: CPT | Mod: 25

## 2020-05-05 PROCEDURE — 80053 COMPREHEN METABOLIC PANEL: CPT

## 2020-05-05 RX ORDER — SUCRALFATE 1 G
1 TABLET ORAL
Qty: 28 | Refills: 0
Start: 2020-05-05 | End: 2020-05-11

## 2020-05-05 RX ORDER — METOCLOPRAMIDE HCL 10 MG
10 TABLET ORAL ONCE
Refills: 0 | Status: COMPLETED | OUTPATIENT
Start: 2020-05-05 | End: 2020-05-05

## 2020-05-05 RX ORDER — SODIUM CHLORIDE 9 MG/ML
1000 INJECTION INTRAMUSCULAR; INTRAVENOUS; SUBCUTANEOUS ONCE
Refills: 0 | Status: COMPLETED | OUTPATIENT
Start: 2020-05-05 | End: 2020-05-05

## 2020-05-05 RX ORDER — FAMOTIDINE 10 MG/ML
20 INJECTION INTRAVENOUS ONCE
Refills: 0 | Status: COMPLETED | OUTPATIENT
Start: 2020-05-05 | End: 2020-05-05

## 2020-05-05 RX ORDER — ONDANSETRON 8 MG/1
4 TABLET, FILM COATED ORAL ONCE
Refills: 0 | Status: COMPLETED | OUTPATIENT
Start: 2020-05-05 | End: 2020-05-05

## 2020-05-05 RX ORDER — SUCRALFATE 1 G
1 TABLET ORAL ONCE
Refills: 0 | Status: COMPLETED | OUTPATIENT
Start: 2020-05-05 | End: 2020-05-05

## 2020-05-05 RX ORDER — MORPHINE SULFATE 50 MG/1
4 CAPSULE, EXTENDED RELEASE ORAL ONCE
Refills: 0 | Status: DISCONTINUED | OUTPATIENT
Start: 2020-05-05 | End: 2020-05-05

## 2020-05-05 RX ADMIN — Medication 10 MILLIGRAM(S): at 21:10

## 2020-05-05 RX ADMIN — MORPHINE SULFATE 4 MILLIGRAM(S): 50 CAPSULE, EXTENDED RELEASE ORAL at 16:43

## 2020-05-05 RX ADMIN — SODIUM CHLORIDE 1000 MILLILITER(S): 9 INJECTION INTRAMUSCULAR; INTRAVENOUS; SUBCUTANEOUS at 16:43

## 2020-05-05 RX ADMIN — FAMOTIDINE 20 MILLIGRAM(S): 10 INJECTION INTRAVENOUS at 16:43

## 2020-05-05 RX ADMIN — ONDANSETRON 4 MILLIGRAM(S): 8 TABLET, FILM COATED ORAL at 16:42

## 2020-05-05 RX ADMIN — Medication 1 GRAM(S): at 16:43

## 2020-05-05 NOTE — ED ADULT TRIAGE NOTE - CHIEF COMPLAINT QUOTE
Epigastric pain that began yesterday, denies n/v/d.   Saw her OBGYN Dr. Piañ who referred her to the ED.

## 2020-05-05 NOTE — ED ADULT NURSE NOTE - OBJECTIVE STATEMENT
RUQ / Epigastric pain that began yesterday, denies n/v/d.   Saw her OBGYN Dr. Piña who referred her to the ED.

## 2020-05-05 NOTE — ED STATDOCS - PROGRESS NOTE DETAILS
pt Is seen by dr sprague initially agreed With hx , PE and plan   us negative pt still has epigastric , RUQ and Rlq abd pain , . denies any chest pain ., SOb , cough, fever or sick contact . Ct scan With hydrosalpinx that is recommend the US . pt feels better after Gi cocktail and pain med . us of pelvis ordred . pt is endorsed to JESSI ortiz  follow up with US and re evaluate the pt PA NOTE: Results of US reviewed "Bilateral hydrosalpinges". Pt with improvement in symptoms s/p treatment. Will treat with course of PO carafate. Advised to follow up with Dr. Blackwell upon discharge if symptoms persist. Advised to follow up with primary OBGYN for Bilateral hydrosalpinges. Pt given strict return instructions.

## 2020-05-05 NOTE — ED STATDOCS - ATTENDING CONTRIBUTION TO CARE
I, Ken Joy, performed the initial face to face bedside interview with this patient regarding history of present illness, review of symptoms and relevant past medical, social and family history.  I completed an independent physical examination.  I was the initial provider who evaluated this patient. I have signed out the follow up of any pending tests (i.e. labs, radiological studies) to the ACP.  I have communicated the patient’s plan of care and disposition with the ACP.

## 2020-05-05 NOTE — ED STATDOCS - CARE PROVIDER_API CALL
Jamarcus Blackwell)  Gastroenterology; Internal Medicine  [address] [facility] [facility] [address]  [place] [address]  Phone: [phone]  Fax: [phone]  Follow Up Time:

## 2020-05-05 NOTE — ED STATDOCS - OBJECTIVE STATEMENT
45 y/o F pt with no significant PMHx presents to the ED c/o constant abdominal pain onset yesterday. Aggravating factor of eating. The abdominal pain was initially intermittent, but has since become constant, prompting her to get evaluated. She reports that the symptoms onset with no precipitating factors. She went to her OB/GYN Dr. Piña, and had an internal exam performed which was benign, prompting him to sent the patient to the ED. Patient has been taking Tylenol with minimal relief. Denies vomiting, diarrhea. No further acute complaints at this time.   SHx: Hysterectomy(2 years ago)

## 2020-05-05 NOTE — PHYSICAL EXAM
[Awake] : awake [Alert] : alert [Normal Appearance] : was normal in appearance [Acute Distress] : no acute distress [Swelling] : was not swollen [Tenderness] : was non-tender [Mass (___cm)] : had no masses [Induration] : was not indurated [Scar] : showed no scars [No Tracheal Deviation] : the trachea was midline [None] : there were no thyroid nodules [Mass] : no breast mass [Nipple Discharge] : no nipple discharge [Axillary LAD] : no axillary lymphadenopathy [Examination Of The Breasts] : a normal appearance [No Discharge] : no discharge [The Right Breast Was Examined] : a normal appearance [The Left Breast Was Examined] : a normal appearance [Soft] : soft [Tender] : non tender [Oriented x3] : oriented to person, place, and time [Normal] : vagina [No Bleeding] : there was no active vaginal bleeding [Absent] : absent [Uterine Adnexae] : were not tender and not enlarged

## 2020-05-05 NOTE — ED ADULT NURSE NOTE - CHIEF COMPLAINT QUOTE
Epigastric pain that began yesterday, denies n/v/d.   Saw her OBGYN Dr. Piña who referred her to the ED.

## 2020-05-05 NOTE — HISTORY OF PRESENT ILLNESS
[Lower-Rt-Q] : lower right quadrant [Suprapubic] : suprapubic area [Lower-Lt-Q] : left lower quadrant [Burning] : no burning [Continuous] : continuous [Sharp] : sharp [Stabbing] : stabbing [6/10] : is 6/10 in severity [Nausea] : no nausea [Fever] : no fever [Vomiting] : no vomiting [Vaginal Discharge] : no vaginal discharge [Diarrhea] : no diarrhea [Vaginal Bleeding] : no vaginal bleeding [Pelvic Pressure] : no pelvic pressure [Dysuria] : no dysuria [Pain with BMs] : no pain with bowel movements [Dysmenorrhea] : no dysmenorrhea [Heat] : improved by heat [Non-opiod Analgesic] : improved by non-opiod analgesic [Rest] : not improved with rest [Activity] : worsened by activity [Emotional Stress] : not worsened by emotional stress [Defecation] : not worsened by defecation [[place]] : not worsened by intercourse [Lifting] : worsened by lifting [Menses] : not worsened by menses [Movement] : worsened by movement [Urination] : not worsened by urination [Early Pregnancy] : not pregnant [Excessive Physical Activity] : no excessive physical activity [Current IUD] : not currently using an IUD [Previous IUD] : no history of IUD use [Appendicitis] : no history of appendicitis [Cholelithiasis] : no history of cholelithiasis [Crohn's Disease] : no history of Crohn's disease [Diverticular Disease] : no history of Diverticular disease [Intrauterine Pregnancy] : no history of intrauterine pregnancy [Nephrolithiasis] : no history of nephrolithiasis [Ovarian Mass] : no history of ovarian mass [Ovarian Torsion] : no history of ovarian torsion

## 2020-05-06 LAB
C TRACH RRNA SPEC QL NAA+PROBE: NOT DETECTED
CULTURE RESULTS: SIGNIFICANT CHANGE UP
N GONORRHOEA RRNA SPEC QL NAA+PROBE: NOT DETECTED
SOURCE TP AMPLIFICATION: NORMAL
SPECIMEN SOURCE: SIGNIFICANT CHANGE UP

## 2020-05-07 ENCOUNTER — TRANSCRIPTION ENCOUNTER (OUTPATIENT)
Age: 45
End: 2020-05-07

## 2020-05-07 LAB — HPV HIGH+LOW RISK DNA PNL CVX: NOT DETECTED

## 2020-05-08 LAB — CYTOLOGY CVX/VAG DOC THIN PREP: ABNORMAL

## 2020-05-23 ENCOUNTER — TRANSCRIPTION ENCOUNTER (OUTPATIENT)
Age: 45
End: 2020-05-23

## 2021-06-03 NOTE — ED ADULT NURSE NOTE - BOWEL SOUNDS RUQ
Strength: [facility] plus Medical Necessity Clause: This procedure was medically necessary because the lesions that were treated were: Curette Before Application?: No Cantharone Forte Duration Text (Please Remove Duration From Postcare): The patient was instructed to leave the Cantharone Forte on for 6-8 hours and then wash the area well with soap and water. Consent: The patient's consent was obtained including but not limited to risks of crusting, scabbing, scarring, blistering, darker or lighter pigmentary change, recurrence, incomplete removal and infection. Cantharone Duration Text (Please Remove Duration From Postcare): The patient was instructed to leave the Cantharone on for 2 hours and then wash the area well with soap and water. Medical Necessity Information: It is in your best interest to select a reason for this procedure from the list below. All of these items fulfill various CMS LCD requirements except the new and changing color options. Canthacur Ps Duration Text (Please Remove Duration From Postcare): The patient was instructed to leave the Canthacur PS on for 6-8 hours and then wash the area well with soap and water. Post-Care Instructions: I reviewed with the patient in detail post-care instructions. The patient understands that the treated areas should be washed off 2 hours after application. Detail Level: Detailed Canthacur Duration Text (Please Remove Duration From Postcare): The patient was instructed to leave the Canthacur on for 6-8 hours and then wash the area well with soap and water. Cantharone Plus Duration Text (Please Remove Duration From Postcare): The patient was instructed to leave the Cantharone Plus on for 2 hours and then wash the area well with soap and water. Curette Text: Prior to application of cantharidin the lesions were lightly pared with a curette. present

## 2021-09-08 NOTE — H&P PST ADULT - BLOOD TRANSFUSION, PREVIOUS, PROFILE
SUBJECTIVE:   CC: Jose Francisco Yuan is an 38 year old male who presents for preventative health visit.       Patient has been advised of split billing requirements and indicates understanding: Yes  Healthy Habits:     Getting at least 3 servings of Calcium per day:  Yes    Bi-annual eye exam:  Yes    Dental care twice a year:  Yes    Sleep apnea or symptoms of sleep apnea:  None    Diet:  Regular (no restrictions)    Frequency of exercise:  4-5 days/week    Duration of exercise:  45-60 minutes    Taking medications regularly:  Yes (vitamins )    Barriers to taking medications:  None    Medication side effects:  None    PHQ-2 Total Score: 0    Additional concerns today:  No    Sleep issues.  Wakes early (3-4 am) about 1/3 of the time.  Some issues w/ anxiety. Work stress can be an issue as well.   Typically does not feel tired during the day.   Takes melatonin 3 or 5 mg per night.   Has tried Z-Quil.       Today's PHQ-2 Score:   PHQ-2 ( 1999 Pfizer) 9/8/2021   Q1: Little interest or pleasure in doing things 0   Q2: Feeling down, depressed or hopeless 0   PHQ-2 Score 0   Q1: Little interest or pleasure in doing things Not at all   Q2: Feeling down, depressed or hopeless Not at all   PHQ-2 Score 0     Abuse: Current or Past(Physical, Sexual or Emotional)- No  Do you feel safe in your environment? Yes    Have you ever done Advance Care Planning? (For example, a Health Directive, POLST, or a discussion with a medical provider or your loved ones about your wishes): No, advance care planning information given to patient to review.  Patient declined advance care planning discussion at this time.    Social History     Tobacco Use     Smoking status: Never Smoker     Smokeless tobacco: Never Used   Substance Use Topics     Alcohol use: Yes     If you drink alcohol do you typically have >3 drinks per day or >7 drinks per week? No    Alcohol Use 9/8/2021   Prescreen: >3 drinks/day or >7 drinks/week? No   Prescreen: >3 drinks/day  "or >7 drinks/week? -     Reviewed orders with patient. Reviewed health maintenance and updated orders accordingly - Yes    Review of Systems   Constitutional: Negative for chills and fever.   HENT: Negative for congestion, ear pain, hearing loss and sore throat.    Eyes: Negative for pain and visual disturbance.   Respiratory: Negative for cough and shortness of breath.    Cardiovascular: Negative for chest pain, palpitations and peripheral edema.   Gastrointestinal: Negative for abdominal pain, constipation, diarrhea, heartburn, hematochezia and nausea.   Genitourinary: Negative for dysuria, frequency, genital sores, hematuria and urgency.   Musculoskeletal: Negative for arthralgias, joint swelling and myalgias.   Skin: Negative for rash.   Neurological: Negative for dizziness, weakness, headaches and paresthesias.   Psychiatric/Behavioral: Negative for mood changes. The patient is not nervous/anxious.        OBJECTIVE:   /84   Pulse 71   Temp 97.5  F (36.4  C) (Temporal)   Resp 16   Ht 1.908 m (6' 3.12\")   Wt 93.3 kg (205 lb 11.2 oz)   SpO2 99%   BMI 25.63 kg/m      Physical Exam  GENERAL: healthy, alert and no distress  EYES: Eyes grossly normal to inspection, PERRL and conjunctivae and sclerae normal  HENT: ear canals and TM's normal  NECK: no adenopathy, no asymmetry, masses, or scars and thyroid normal to palpation  RESP: lungs clear to auscultation - no rales, rhonchi or wheezes  CV: regular rate and rhythm, normal S1 S2, no murmur, no peripheral edema and peripheral pulses strong  ABDOMEN: soft, nontender, bowel sounds normal  MS: no gross musculoskeletal defects noted, no edema  SKIN: no suspicious lesions or rashes  NEURO: Normal strength and tone, mentation intact and speech normal  PSYCH: mentation appears normal, affect normal/bright      ASSESSMENT/PLAN:       ICD-10-CM    1. Routine general medical examination at a health care facility  Z00.00          COUNSELING:   Reviewed preventive " "health counseling, as reflected in patient instructions    Estimated body mass index is 25.63 kg/m  as calculated from the following:    Height as of this encounter: 1.908 m (6' 3.12\").    Weight as of this encounter: 93.3 kg (205 lb 11.2 oz).       He reports that he has never smoked. He has never used smokeless tobacco.      Anthony Engle MD  [facility] JARED  " no

## 2022-06-08 NOTE — ED STATDOCS - CROS ED ROS STATEMENT
Referred by: Dustin Aquino MD; Medical Diagnosis (from order):    Diagnosis Information      Diagnosis    V45.89 (ICD-9-CM) - Z98.890 (ICD-10-CM) - S/P carpal tunnel release                Daily Treatment Note    Visit: 15  Patient alert and oriented X3.    SUBJECTIVE                                                                                                               Patient reporting that she is moving well/pain has been well controlled.  Functional Change: Able to put in hair accessory; was challenging  Pain / Symptoms:  Pain rating (out of 10): Current: 0     OBJECTIVE                                                                                                                     Range of Motion (ROM)   (degrees unless noted; active unless noted; norms in ( ); negative=lacking to 0, positive=beyond 0)   Shoulder:     - Flexion (180):        • Right: 165  Passive: 170     - Extension (50):        • Right: Passive: 58     - Abduction (180):        • Right: Passive: 162     - Internal Rotation at 45°:         • Right: Passive: 55    - Behind Back Internal Rotation:        • Right: T10    - External Rotation at 90° (90):         • Right: Passive: 88    - Behind the Head External Rotation:        • Right: T3     Palpation:   Comments / Details: Anterior shoulder; RTC insertion      Sensation:   Intermittent numbness to touch at tip of fingers of 3rd>2nd>thumb     TREATMENT                                                                                                                  Therapeutic Exercise:  IWU Pulleys for warm up  Objective measures  Update HEP/added shoulder ER abducted  Thread the needle to thoracic rotation   Wall slide, bilateral to lift off emphasizing scapula, spinal extension and GHJ movement/shaky        Manual Therapy:  STM to axillary region/subscapularis  Scaption with scapula stabilized  GHJ PROM in all directions per guidelines  GHJ AP glides and inferior glides  Scaption with  scapula stabilized/lat stretch  Posterior capsule stretch with scapula stabilized  STM/pump massage to posterior cuff, proximal arm  PT assisted IR capsule stretch curve glide  Long axis ER/IR capsule stretch  C/R to gain IR    Skilled input: verbal instruction/cues, tactile instruction/cues and as detailed above    Writer verbally educated and received verbal consent for hand placement, positioning of patient, and techniques to be performed today from patient for hand placement and palpation for techniques and therapist position for techniques as described above and how they are pertinent to the patient's plan of care.    Home Exercise Program/Education Materials: Access Code: [email or web address]  Exercises  · Seated Scapular Retraction - 3-5 x daily - 7 x weekly - 1 sets - 10 reps  · Supine Shoulder Flexion AAROM with Dowel - 2 x daily - 7 x weekly - 10 reps - 2 sets - 5 hold  · Shoulder Internal Rotation with Resistance - 1-2 x daily - 7 x weekly - 2 sets - 15 reps  · Shoulder External Rotation with Anchored Resistance - 1 x daily - 3 x weekly - 1 sets - 20 reps  · Seated Shoulder External Rotation in Abduction Supported with Dumbbell - 1 x daily - 3 x weekly - 1 sets - 20 reps  · Shoulder Extension with Resistance - 1 x daily - 3 x weekly - 30 reps  · Standing Bilateral Low Shoulder Row with Anchored Resistance - 1 x daily - 3 x weekly - 30 reps  · Standing Shoulder Scaption - 1 x daily - 3 x weekly - 1 sets - 10-30 reps  · Shoulder Abduction with Dumbbells - Thumbs Up - 1 x daily - 3 x weekly - 30 reps  · Standing Shoulder Flexion to 90 Degrees with Dumbbells - 1 x daily - 3 x weekly - 30 reps  · Seated Bicep Curls Supinated with Dumbbells - 1 x daily - 3 x weekly - 3 sets - 10 reps  · Standing Bent Over Single Arm Tricep Extension with Dumbbell and PLB - 1 x daily - 3 x weekly - 3 sets - 10 reps  · Seated Overhead Press with Dumbbells - 1 x daily - 3 x weekly - 3 sets - 10 reps  · Sleeper Stretch - 1 x daily - 3 x weekly  - 1 reps - 30-60 seconds hold  · Quadruped Thoracic Rotation - Reach Under - 1 x daily - 2-3 x weekly - 5 reps  · Low Trap Setting at Wall - 1 x daily - 3 x weekly - 1 sets - 5-10 reps            ASSESSMENT                                                                                                             Patient has progressed well to date and has demonstrated good recall of HEP for both shoulder and carpal tunnel. Patient has extensive program as she is an active female/she does know that she can further taper program as she resumes normal activities.  Pain/symptoms after session (out of 10): 0  Patient Education:   Results of above outlined education: Verbalizes understanding and Demonstrates understanding      PLAN                                                                                                                           Suggestions for next session as indicated: Will work on HEP independently for 2 weeks then return for final measurements        GOALS                                                                                                                           Decrease pain/symptoms to 1/10  Improve involved strength to > or= 4+/5  Improve involved shoulder ROM to within 10 degrees of contralateral side in all directions  The above improvements in impairments to assist in obtaining goals listed below  Long Term Goals: to be met by end of plan of care  1. Patient will lift 40# without reported pain to tolerate home activities Status: progressing/ongoing Currently 12# /force production for flexion with dynamometer-able to generate 23# per side  2. Patient will sleep 6 hours without disruption from pain/difficulty with positional changes.  Status: met   3. Patient will reach overhead, out to side, behind back, at and above shoulder height and above head without reported difficulty for completion of ADLs and age appropriate activities Status: partially met  Overhead motion shaky but  not painful  4. Patient will be independent with progressed and modified home exercise program.  Status: met       Therapy procedure time and total treatment time can be found documented on the Time Entry flowsheet   all other ROS negative except as per HPI

## 2022-07-03 NOTE — PROCEDURE NOTE - NSTIMEOUT_GEN_A_CORE
Patient's first and last name, , procedure, and correct site confirmed prior to the start of procedure.
18

## 2023-03-31 NOTE — ASU PREOP CHECKLIST - PATIENT'S PERSONAL PROPERTY GIVEN TO
[FreeTextEntry1] : Patient is a 63-year-old gentleman with past medical history significant for diabetes, former heavy smoker with extensive diabetic foot issues in the past.  Patient has had 2 extensive left foot surgeries done secondary to Charcot foot.  Second surgery was done recently August 2022 with extensive reconstruction of the foot.\par \par Right foot toe amputation has healed well.  This was done in March 2020.\par \par Patient comes to us for evaluation of bilateral lower extremity cellulitis and redness and mild swelling.\par \par Symptoms have improved significantly with start of antibiotics.  Patient currently is in the middle of a 10-day course of antibiotics along with application of antifungal creams.\par \par No fevers or chills.  No rest pain or claudication symptoms.\par \par No history of DVT.\par \par No history of CVA or TIA.\par \par  friend/significant other

## 2023-05-16 NOTE — PATIENT PROFILE ADULT. - NS PRO AD NO ADVANCE DIRECTIVE
Render Post-Care Instructions In Note?: no
Post-Care Instructions: I reviewed with the patient in detail post-care instructions. Patient is to wear sunprotection, and avoid picking at any of the treated lesions. Pt may apply Vaseline to crusted or scabbing areas.
Detail Level: Simple
Application Tool (Optional): Liquid Nitrogen Sprayer
Number Of Freeze-Thaw Cycles: 2 freeze-thaw cycles
Consent: The patient's consent was obtained including but not limited to risks of crusting, scabbing, blistering, scarring, darker or lighter pigmentary change, recurrence, incomplete removal and infection.
Show Aperture Variable?: Yes
No

## 2023-06-26 ENCOUNTER — EMERGENCY (EMERGENCY)
Facility: HOSPITAL | Age: 48
LOS: 1 days | Discharge: HOME | End: 2023-06-26
Attending: EMERGENCY MEDICINE
Payer: SELF-PAY

## 2023-06-26 VITALS
SYSTOLIC BLOOD PRESSURE: 126 MMHG | WEIGHT: 159.84 LBS | TEMPERATURE: 98 F | HEIGHT: 64 IN | HEART RATE: 76 BPM | RESPIRATION RATE: 20 BRPM | DIASTOLIC BLOOD PRESSURE: 76 MMHG | OXYGEN SATURATION: 98 %

## 2023-06-26 DIAGNOSIS — Z98.890 OTHER SPECIFIED POSTPROCEDURAL STATES: Chronic | ICD-10-CM

## 2023-06-26 DIAGNOSIS — Z98.89 OTHER SPECIFIED POSTPROCEDURAL STATES: Chronic | ICD-10-CM

## 2023-06-26 DIAGNOSIS — Z98.51 TUBAL LIGATION STATUS: Chronic | ICD-10-CM

## 2023-06-26 DIAGNOSIS — O03.9 COMPLETE OR UNSPECIFIED SPONTANEOUS ABORTION WITHOUT COMPLICATION: Chronic | ICD-10-CM

## 2023-06-26 PROCEDURE — 99282 EMERGENCY DEPT VISIT SF MDM: CPT

## 2023-06-26 PROCEDURE — 99284 EMERGENCY DEPT VISIT MOD MDM: CPT

## 2023-06-26 RX ORDER — ACETAMINOPHEN 500 MG
975 TABLET ORAL ONCE
Refills: 0 | Status: COMPLETED | OUTPATIENT
Start: 2023-06-26 | End: 2023-06-26

## 2023-06-26 RX ORDER — IBUPROFEN 200 MG
1 TABLET ORAL
Qty: 30 | Refills: 0
Start: 2023-06-26

## 2023-06-26 RX ADMIN — Medication 975 MILLIGRAM(S): at 08:50

## 2023-06-26 NOTE — ED STATDOCS - CLINICAL SUMMARY MEDICAL DECISION MAKING FREE TEXT BOX
48 y/o female patient with left breast pain. Will obtain breast US 46 y/o female patient with left breast pain. Will obtain breast US as outpatient.  In supervised beast exam, no masses felt and pain was reproducible with stretching the chest wall.  Pt advised very clearly to f/u in the next week to two weeks for fu exam with pcp and breast US

## 2023-06-26 NOTE — ED STATDOCS - OBJECTIVE STATEMENT
48 y/o female with no pertinent PMHx presents with 2 weeks of intermittent left breast pain. Reports worse squeezing pain last night. Denies change in skin or nipple or arm swelling. Denies SOB. Reports hysterectomy 5 years ago 46 y/o female with no pertinent PMHx presents with 2 weeks of intermittent left breast pain. Reports a worse squeezing pain of left breast last night. Reports pain is constant since last night. Denies change in skin or nipple or arm swelling. Denies SOB. Reports hysterectomy 5 years ago

## 2023-06-26 NOTE — ED STATDOCS - NSICDXPASTSURGICALHX_GEN_ALL_CORE_FT
PAST SURGICAL HISTORY:       S/P  section x 4    S/P dilation and curettage     S/P tubal ligation

## 2023-06-26 NOTE — ED ADULT NURSE NOTE - NSHOSCREENINGQ1_ED_ALL_ED
See entry below. Pt is c/o having her eyes looking puffy, fatigue, a headache, her ears feeling clogged, some sinus pressure, a scratchy throat, an occasional cough, some sob at times, and a somewhat decreased sense of taste and smell. Symptoms started this past Monday. No fever, no GI symptoms, no dizziness, no body aches, and no feelings of tightness, pressure, or burning in her chest. Please advise.    No

## 2023-06-26 NOTE — ED STATDOCS - PHYSICAL EXAMINATION
Breast exam: Chaperoned by JO Mcghee. Patient pointed to area of tenderness to upper middle left breast, no lump palpated, but did feel more fibrous tissue in that area, but otherwise no lumps and normal nipple Breast exam: Chaperoned by JO Mcghee. Patient pointed to area of tenderness to upper middle left breast, no lump palpated, but did feel more fibrous tissue in that area, but otherwise no lumps and normal nipple. Chest wall in area tender to palpation, and pain is reproducible with stretching of pectoral muscle

## 2023-06-26 NOTE — ED STATDOCS - MUSCULOSKELETAL, MLM
No chest wall tenderness on palpation, range of motion is not limited and there is no muscle tenderness. range of motion is not limited

## 2023-06-26 NOTE — ED ADULT NURSE NOTE - CHIEF COMPLAINT QUOTE
Pt arrives to ED c/o non traumatic L breast pain  for one wee. Denies swelling / redness or any discharge- states " there is a lump "
no

## 2023-06-26 NOTE — ED STATDOCS - NSICDXFAMILYHX_GEN_ALL_CORE_FT
FAMILY HISTORY:  Father  Still living? Unknown  Family history of hypertension in mother, Age at diagnosis: Age Unknown    Mother  Still living? Unknown  Family history of hypertension in mother, Age at diagnosis: Age Unknown

## 2023-06-26 NOTE — ED ADULT TRIAGE NOTE - CHIEF COMPLAINT QUOTE
Pt arrives to ED c/o non traumatic L breast pain  for one wee. Denies swelling / redness or any discharge- states " there is a lump "

## 2023-06-26 NOTE — ED ADULT TRIAGE NOTE - WEIGHT IN LBS
"  Assessment & Plan   Multiple complaints today, several related to general allergy symptoms (cough, itchy eyes, etc).  -- Ciprodex drops for otitis externa.  Avoiding itching the ears.  -- Discussed allergy treatment options.  She elects to use cetirizine 10 mg at bedtime PRN and fluticasone nasal spray.  -- For skin, in addition to cetirizine, recommend evaluating mattress and linens for bed bugs.  Wash in hot water.    Late period.  Check UPT -- negative.    Follow-up as needed.    Rasheeda Lyn is a 32 year old female with no significant past medical history who presents for multiple reasons:    1. Itchy skin.  She has itchy bumps on her skin for the past 2 months.  The itching makes it hard to sleep at night.  She has not tried anything for it yet.    2. Non-productive cough.  When she lived in [place], she used an inhaler.  She finds the cold air and season changes in [place] are exacerbating that cough.  She works in a cold environment.    3. Itchy eyes bilaterally.  She rubs her eyes frequently.  Applying a warm wash cloth provides some relief, but then it returns.  She is worried they might be infected because they've been red lately.    4. Right ear pain.  Every summer, so gets ear pain with some decreased hearing.  No drainage.  She itches it with a Q-tip.    Social: She reports that she has never smoked. She uses smokeless tobacco. She reports that she does not drink alcohol or use drugs.     name: Bj DavenportooAlessandro Javier  Language: Enma  Agency:  [employer]  Phone number: [phone]  Type of interpretation:  Face-to-face, spoken    Objective   Vitals: /81   Pulse 76   Temp 98.5  F (36.9  C) (Oral)   Resp 20   Ht 1.48 m (4' 10.27\")   Wt 62.8 kg (138 lb 6.4 oz)   LMP 04/05/2021 (Approximate)   SpO2 99%   BMI 28.66 kg/m    General: Pleasant. Young woman. No distress.  HEENT: Moist mucous membranes. Sclera injected bilaterally but without drainage.  Right ear canal erythematous " and swollen; left ear canal normal. Tympanic membranes clear bilaterally. Hearing grossly intact. Oropharynx without swelling, erythema, or exudate. No cervical lymphadenopathy.  Heart: Regular rate and rhythm. No murmurs, rubs, or gallops.  Lungs: Clear to auscultation bilaterally. No wheezes or crackles. Good air movement.  Skin: Multiple punctate papules, mostly extremities.    Labs  Results for orders placed or performed in visit on 06/08/21   HCG Qualitative Urine (UPT)  ([facility])     Status: None   Result Value Ref Range    HCG Qual Urine Negative Negative          159.8

## 2023-07-28 NOTE — DISCHARGE NOTE ADULT - REASON FOR ADMISSION
" I have fibroids and need a hysterectomy" Abilio Burnette  1957  [id number]    Situation:  Verbal report given from: Carlton Juan  Procedure: Procedure(s):  ELECTROCONVULSIVE THERAPY    Background:    Preoperative diagnosis: Major depressive disorder, recurrent episode, moderate ([address]) [F33.1]    Postoperative diagnosis: * No post-op diagnosis entered *    :  Dr. Rina Wadsworth    Assistant(s): Circulator: Adriano Powers RN  Scrub Person First: Ivis Napoles    Specimens: * No specimens in log *    Assessment:  Intra-procedure medications         Anesthesia gave intra-procedure sedation and medications, see anesthesia flow sheet     Intravenous fluids: LR@ KVO     Vital signs stable yes      Recommendation:    Permission to share finding with

## 2023-11-03 NOTE — PROGRESS NOTE ADULT - PROBLEM SELECTOR PROBLEM 2
Cellulitis, unspecified cellulitis site Helical Rim Advancement Flap Text: Because of the tightness of the surrounding skin, the full-thickness nature of the defect with exposed cartilage, and to avoid deformity, a helical rim advancement flap was planned.  After prep and anesthesia, any protuberant cartilage or cartilage in the way of tissue movement and approximation was excised.  Then, an incision was made in the anterior portion of the ear through the skin to the posterior ear at the level of the perichondrium both superiorly and inferiorly.  Inferiorly, this extended to the lobule where a Burow's triangle was excised anteriorly.  The flaps were then  from the ear posteriorly at the level of the perichondrium to the postauricular sulcus.  After hemostasis obtained, the flaps were carried over and closed in a layered fashion

## 2024-02-26 ENCOUNTER — EMERGENCY (EMERGENCY)
Facility: HOSPITAL | Age: 49
LOS: 1 days | Discharge: HOME | End: 2024-02-26
Attending: STUDENT IN AN ORGANIZED HEALTH CARE EDUCATION/TRAINING PROGRAM
Payer: SELF-PAY

## 2024-02-26 VITALS
SYSTOLIC BLOOD PRESSURE: 123 MMHG | RESPIRATION RATE: 18 BRPM | DIASTOLIC BLOOD PRESSURE: 67 MMHG | HEART RATE: 69 BPM | OXYGEN SATURATION: 100 % | TEMPERATURE: 98 F

## 2024-02-26 VITALS
DIASTOLIC BLOOD PRESSURE: 79 MMHG | RESPIRATION RATE: 20 BRPM | TEMPERATURE: 99 F | WEIGHT: 160.5 LBS | HEART RATE: 79 BPM | HEIGHT: 64 IN | SYSTOLIC BLOOD PRESSURE: 133 MMHG | OXYGEN SATURATION: 100 %

## 2024-02-26 DIAGNOSIS — O03.9 COMPLETE OR UNSPECIFIED SPONTANEOUS ABORTION WITHOUT COMPLICATION: Chronic | ICD-10-CM

## 2024-02-26 DIAGNOSIS — Z98.51 TUBAL LIGATION STATUS: Chronic | ICD-10-CM

## 2024-02-26 DIAGNOSIS — Z98.89 OTHER SPECIFIED POSTPROCEDURAL STATES: Chronic | ICD-10-CM

## 2024-02-26 DIAGNOSIS — Z98.890 OTHER SPECIFIED POSTPROCEDURAL STATES: Chronic | ICD-10-CM

## 2024-02-26 LAB
ALBUMIN SERPL ELPH-MCNC: 4.1 G/DL — SIGNIFICANT CHANGE UP (ref 3.3–5.2)
ALP SERPL-CCNC: 76 U/L — SIGNIFICANT CHANGE UP (ref 40–120)
ALT FLD-CCNC: 19 U/L — SIGNIFICANT CHANGE UP
ANION GAP SERPL CALC-SCNC: 10 MMOL/L — SIGNIFICANT CHANGE UP (ref 5–17)
APPEARANCE UR: CLEAR — SIGNIFICANT CHANGE UP
AST SERPL-CCNC: 20 U/L — SIGNIFICANT CHANGE UP
BASE EXCESS BLDV CALC-SCNC: 0.6 MMOL/L — SIGNIFICANT CHANGE UP (ref -2–3)
BASOPHILS # BLD AUTO: 0.02 K/UL — SIGNIFICANT CHANGE UP (ref 0–0.2)
BASOPHILS NFR BLD AUTO: 0.2 % — SIGNIFICANT CHANGE UP (ref 0–2)
BILIRUB SERPL-MCNC: 0.2 MG/DL — LOW (ref 0.4–2)
BILIRUB UR-MCNC: NEGATIVE — SIGNIFICANT CHANGE UP
BUN SERPL-MCNC: 10.4 MG/DL — SIGNIFICANT CHANGE UP (ref 8–20)
CA-I SERPL-SCNC: 1.29 MMOL/L — SIGNIFICANT CHANGE UP (ref 1.15–1.33)
CALCIUM SERPL-MCNC: 9.1 MG/DL — SIGNIFICANT CHANGE UP (ref 8.4–10.5)
CHLORIDE BLDV-SCNC: 104 MMOL/L — SIGNIFICANT CHANGE UP (ref 96–108)
CHLORIDE SERPL-SCNC: 105 MMOL/L — SIGNIFICANT CHANGE UP (ref 96–108)
CO2 SERPL-SCNC: 25 MMOL/L — SIGNIFICANT CHANGE UP (ref 22–29)
COLOR SPEC: YELLOW — SIGNIFICANT CHANGE UP
CREAT SERPL-MCNC: 0.62 MG/DL — SIGNIFICANT CHANGE UP (ref 0.5–1.3)
DIFF PNL FLD: NEGATIVE — SIGNIFICANT CHANGE UP
EGFR: 110 ML/MIN/1.73M2 — SIGNIFICANT CHANGE UP
EOSINOPHIL # BLD AUTO: 0.1 K/UL — SIGNIFICANT CHANGE UP (ref 0–0.5)
EOSINOPHIL NFR BLD AUTO: 1 % — SIGNIFICANT CHANGE UP (ref 0–6)
GAS PNL BLDV: 137 MMOL/L — SIGNIFICANT CHANGE UP (ref 136–145)
GAS PNL BLDV: SIGNIFICANT CHANGE UP
GAS PNL BLDV: SIGNIFICANT CHANGE UP
GLUCOSE BLDV-MCNC: 102 MG/DL — HIGH (ref 70–99)
GLUCOSE SERPL-MCNC: 104 MG/DL — HIGH (ref 70–99)
GLUCOSE UR QL: NEGATIVE MG/DL — SIGNIFICANT CHANGE UP
HCO3 BLDV-SCNC: 27 MMOL/L — SIGNIFICANT CHANGE UP (ref 22–29)
HCT VFR BLD CALC: 39 % — SIGNIFICANT CHANGE UP (ref 34.5–45)
HCT VFR BLDA CALC: 39 % — SIGNIFICANT CHANGE UP
HGB BLD CALC-MCNC: 13.1 G/DL — SIGNIFICANT CHANGE UP (ref 11.7–16.1)
HGB BLD-MCNC: 12.8 G/DL — SIGNIFICANT CHANGE UP (ref 11.5–15.5)
IMM GRANULOCYTES NFR BLD AUTO: 0.3 % — SIGNIFICANT CHANGE UP (ref 0–0.9)
KETONES UR-MCNC: NEGATIVE MG/DL — SIGNIFICANT CHANGE UP
LACTATE BLDV-MCNC: 1.3 MMOL/L — SIGNIFICANT CHANGE UP (ref 0.5–2)
LEUKOCYTE ESTERASE UR-ACNC: NEGATIVE — SIGNIFICANT CHANGE UP
LIDOCAIN IGE QN: 49 U/L — SIGNIFICANT CHANGE UP (ref 22–51)
LYMPHOCYTES # BLD AUTO: 1.92 K/UL — SIGNIFICANT CHANGE UP (ref 1–3.3)
LYMPHOCYTES # BLD AUTO: 18.6 % — SIGNIFICANT CHANGE UP (ref 13–44)
MCHC RBC-ENTMCNC: 27.2 PG — SIGNIFICANT CHANGE UP (ref 27–34)
MCHC RBC-ENTMCNC: 32.8 GM/DL — SIGNIFICANT CHANGE UP (ref 32–36)
MCV RBC AUTO: 83 FL — SIGNIFICANT CHANGE UP (ref 80–100)
MONOCYTES # BLD AUTO: 0.68 K/UL — SIGNIFICANT CHANGE UP (ref 0–0.9)
MONOCYTES NFR BLD AUTO: 6.6 % — SIGNIFICANT CHANGE UP (ref 2–14)
NEUTROPHILS # BLD AUTO: 7.57 K/UL — HIGH (ref 1.8–7.4)
NEUTROPHILS NFR BLD AUTO: 73.3 % — SIGNIFICANT CHANGE UP (ref 43–77)
NITRITE UR-MCNC: NEGATIVE — SIGNIFICANT CHANGE UP
PCO2 BLDV: 58 MMHG — HIGH (ref 39–42)
PH BLDV: 7.28 — LOW (ref 7.32–7.43)
PH UR: 6.5 — SIGNIFICANT CHANGE UP (ref 5–8)
PLATELET # BLD AUTO: 207 K/UL — SIGNIFICANT CHANGE UP (ref 150–400)
PO2 BLDV: <42 MMHG — SIGNIFICANT CHANGE UP (ref 25–45)
POTASSIUM BLDV-SCNC: 4.4 MMOL/L — SIGNIFICANT CHANGE UP (ref 3.5–5.1)
POTASSIUM SERPL-MCNC: 4.2 MMOL/L — SIGNIFICANT CHANGE UP (ref 3.5–5.3)
POTASSIUM SERPL-SCNC: 4.2 MMOL/L — SIGNIFICANT CHANGE UP (ref 3.5–5.3)
PROT SERPL-MCNC: 7 G/DL — SIGNIFICANT CHANGE UP (ref 6.6–8.7)
PROT UR-MCNC: NEGATIVE MG/DL — SIGNIFICANT CHANGE UP
RBC # BLD: 4.7 M/UL — SIGNIFICANT CHANGE UP (ref 3.8–5.2)
RBC # FLD: 13.5 % — SIGNIFICANT CHANGE UP (ref 10.3–14.5)
SAO2 % BLDV: 61.7 % — SIGNIFICANT CHANGE UP
SODIUM SERPL-SCNC: 140 MMOL/L — SIGNIFICANT CHANGE UP (ref 135–145)
SP GR SPEC: 1.01 — SIGNIFICANT CHANGE UP (ref 1–1.03)
UROBILINOGEN FLD QL: 0.2 MG/DL — SIGNIFICANT CHANGE UP (ref 0.2–1)
WBC # BLD: 10.32 K/UL — SIGNIFICANT CHANGE UP (ref 3.8–10.5)
WBC # FLD AUTO: 10.32 K/UL — SIGNIFICANT CHANGE UP (ref 3.8–10.5)

## 2024-02-26 PROCEDURE — 83605 ASSAY OF LACTIC ACID: CPT

## 2024-02-26 PROCEDURE — 36415 COLL VENOUS BLD VENIPUNCTURE: CPT

## 2024-02-26 PROCEDURE — 74177 CT ABD & PELVIS W/CONTRAST: CPT | Mod: MC

## 2024-02-26 PROCEDURE — 84132 ASSAY OF SERUM POTASSIUM: CPT

## 2024-02-26 PROCEDURE — 80053 COMPREHEN METABOLIC PANEL: CPT

## 2024-02-26 PROCEDURE — 81003 URINALYSIS AUTO W/O SCOPE: CPT

## 2024-02-26 PROCEDURE — 96374 THER/PROPH/DIAG INJ IV PUSH: CPT | Mod: XU

## 2024-02-26 PROCEDURE — 82803 BLOOD GASES ANY COMBINATION: CPT

## 2024-02-26 PROCEDURE — 85014 HEMATOCRIT: CPT

## 2024-02-26 PROCEDURE — 85018 HEMOGLOBIN: CPT

## 2024-02-26 PROCEDURE — 99285 EMERGENCY DEPT VISIT HI MDM: CPT

## 2024-02-26 PROCEDURE — 83690 ASSAY OF LIPASE: CPT

## 2024-02-26 PROCEDURE — 82947 ASSAY GLUCOSE BLOOD QUANT: CPT

## 2024-02-26 PROCEDURE — 84295 ASSAY OF SERUM SODIUM: CPT

## 2024-02-26 PROCEDURE — 82330 ASSAY OF CALCIUM: CPT

## 2024-02-26 PROCEDURE — 99284 EMERGENCY DEPT VISIT MOD MDM: CPT | Mod: 25

## 2024-02-26 PROCEDURE — 85025 COMPLETE CBC W/AUTO DIFF WBC: CPT

## 2024-02-26 PROCEDURE — 82435 ASSAY OF BLOOD CHLORIDE: CPT

## 2024-02-26 PROCEDURE — 74177 CT ABD & PELVIS W/CONTRAST: CPT | Mod: 26,MC

## 2024-02-26 RX ORDER — DIATRIZOATE MEGLUMINE 180 MG/ML
30 INJECTION, SOLUTION INTRAVESICAL ONCE
Refills: 0 | Status: COMPLETED | OUTPATIENT
Start: 2024-02-26 | End: 2024-02-26

## 2024-02-26 RX ORDER — ONDANSETRON 8 MG/1
1 TABLET, FILM COATED ORAL
Qty: 2 | Refills: 0
Start: 2024-02-26

## 2024-02-26 RX ORDER — KETOROLAC TROMETHAMINE 30 MG/ML
15 SYRINGE (ML) INJECTION ONCE
Refills: 0 | Status: DISCONTINUED | OUTPATIENT
Start: 2024-02-26 | End: 2024-02-26

## 2024-02-26 RX ORDER — SODIUM CHLORIDE 9 MG/ML
1000 INJECTION INTRAMUSCULAR; INTRAVENOUS; SUBCUTANEOUS ONCE
Refills: 0 | Status: COMPLETED | OUTPATIENT
Start: 2024-02-26 | End: 2024-02-26

## 2024-02-26 RX ORDER — IBUPROFEN 200 MG
1 TABLET ORAL
Qty: 28 | Refills: 0
Start: 2024-02-26 | End: 2024-03-03

## 2024-02-26 RX ADMIN — Medication 15 MILLIGRAM(S): at 03:27

## 2024-02-26 RX ADMIN — Medication 15 MILLIGRAM(S): at 04:08

## 2024-02-26 RX ADMIN — SODIUM CHLORIDE 1000 MILLILITER(S): 9 INJECTION INTRAMUSCULAR; INTRAVENOUS; SUBCUTANEOUS at 04:08

## 2024-02-26 RX ADMIN — SODIUM CHLORIDE 1000 MILLILITER(S): 9 INJECTION INTRAMUSCULAR; INTRAVENOUS; SUBCUTANEOUS at 03:27

## 2024-02-26 RX ADMIN — DIATRIZOATE MEGLUMINE 30 MILLILITER(S): 180 INJECTION, SOLUTION INTRAVESICAL at 04:28

## 2024-02-26 NOTE — ED ADULT NURSE REASSESSMENT NOTE - NS ED NURSE REASSESS COMMENT FT1
Assumed care of pt from previous RN. Pt returned from CT scan. Pt denies chest pain and SOB. Pt reports abdominal pain is better. Pt RR even and unlabored, NAD noted. Pt awaiting results of CT scan.

## 2024-02-26 NOTE — ED ADULT NURSE NOTE - DISCHARGE DATE/TIME
Pt called would like to know if should be taking Hydroxy Cosequin for preventive Method of the Covid 19 virus 26-Feb-2024 08:25

## 2024-02-26 NOTE — ED PROVIDER NOTE - ATTENDING CONTRIBUTION TO CARE
I, Cathryn Rojas, personally saw the patient with the resident, and completed the key components of the history and physical exam. I then discussed the management plan with the resident.

## 2024-02-26 NOTE — ED PROVIDER NOTE - PHYSICAL EXAMINATION
General: well appearing, NAD  Head: NC, AT  EENT: EOMI, no scleral icterus  Cardiac: RRR, no apparent murmurs, no lower extremity edema  Respiratory: CTABL, no respiratory distress   Abdomen: softly distended abdomen, nonspecific L abd TTP, nonperitonitic  MSK/Vascular: full ROM, soft compartments, warm extremities, 2+ peripheral pulses b/l  Neuro: AAOx3, sensation to light touch intact  Psych: calm, cooperative

## 2024-02-26 NOTE — ED ADULT NURSE NOTE - NSFALLUNIVINTERV_ED_ALL_ED
Bed/Stretcher in lowest position, wheels locked, appropriate side rails in place/Call bell, personal items and telephone in reach/Instruct patient to call for assistance before getting out of bed/chair/stretcher/Non-slip footwear applied when patient is off stretcher/[place] to call system/Physically safe environment - no spills, clutter or unnecessary equipment/Purposeful proactive rounding/Room/bathroom lighting operational, light cord in reach

## 2024-02-26 NOTE — ED PROVIDER NOTE - CLINICAL SUMMARY MEDICAL DECISION MAKING FREE TEXT BOX
49 y/o female w/pmhx of multiple abd surgeries presenting with soft distension and diffuse abd tenderness. Will give IVF, toradol, check labs, CT Abd/Pelv w/PO contrast.

## 2024-02-26 NOTE — ED PROVIDER NOTE - NSFOLLOWUPINSTRUCTIONS_ED_ALL_ED_FT
- Ibuprofen 600mg every 6 hours if needed for pain.  - Follow up with your primary care physician in 2-3 days.  - Return to the ER if there are any concerns.     Abdominal Pain    Many things can cause abdominal pain. Many times, abdominal pain is not caused by a disease and will improve without treatment. Your health care provider will do a physical exam to determine if there is a dangerous cause of your pain; blood tests and imaging may help determine the cause of your pain. However, in many cases, no cause may be found and you may need further testing as an outpatient. Monitor your abdominal pain for any changes.     SEEK IMMEDIATE MEDICAL CARE IF YOU HAVE ANY OF THE FOLLOWING SYMPTOMS: worsening abdominal pain, uncontrollable vomiting, profuse diarrhea, inability to have bowel movements or pass gas, black or bloody stools, fever accompanying chest pain or back pain, or fainting. These symptoms may represent a serious problem that is an emergency. Do not wait to see if the symptoms will go away. Get medical help right away. Call 911 and do not drive yourself to the hospital. - Ibuprofen 600mg every 6 hours if needed for pain.  - Zofran 4mg every 4-6 hours if needed for pain.  - Follow up with your primary care physician in 2-3 days.  - Return to the ER if there are any concerns.     Abdominal Pain    Many things can cause abdominal pain. Many times, abdominal pain is not caused by a disease and will improve without treatment. Your health care provider will do a physical exam to determine if there is a dangerous cause of your pain; blood tests and imaging may help determine the cause of your pain. However, in many cases, no cause may be found and you may need further testing as an outpatient. Monitor your abdominal pain for any changes.     SEEK IMMEDIATE MEDICAL CARE IF YOU HAVE ANY OF THE FOLLOWING SYMPTOMS: worsening abdominal pain, uncontrollable vomiting, profuse diarrhea, inability to have bowel movements or pass gas, black or bloody stools, fever accompanying chest pain or back pain, or fainting. These symptoms may represent a serious problem that is an emergency. Do not wait to see if the symptoms will go away. Get medical help right away. Call 911 and do not drive yourself to the hospital.

## 2024-02-26 NOTE — ED PROVIDER NOTE - OBJECTIVE STATEMENT
48-year-old female with past medical history of fibroids, multiple C-sections, hysterectomy presenting to the ER with 3 days of generalized abdominal pain, endorsing nausea.  Patient states her belly is distended and it feels like she is pregnant even though she had a hysterectomy.  Denies urinary symptoms, vaginal bleeding, chest pain, shortness of breath, diarrhea, vomiting.  Took Tylenol without pain relief.

## 2024-02-26 NOTE — ED ADULT NURSE NOTE - OBJECTIVE STATEMENT
pt presents to ED in NAD c/o generalized abdominal pain x3 days. pt endorses nausea, denies vomiting. pt denies chest pain, N/V, SOB, fever, chills, lightheadedness, and dizziness. pt breathing even and unlabored at this time.

## 2024-02-26 NOTE — ED PROVIDER NOTE - PROGRESS NOTE DETAILS
Dustin: Symptoms significantly improved, CT Abd/Pelv negative. Will dc on pain meds. F/U with PCP. Return precautions provided.

## 2024-02-26 NOTE — ED ADULT NURSE NOTE - SUICIDE SCREENING DEPRESSION
Occupational Therapy  Co-Treatment    Name: King Botello  MRN: [id number]  Admitting Diagnosis:  Nonrheumatic mitral valve stenosis  7 Days Post-Op    Recommendations:     Discharge Recommendations: No Therapy Indicated  Discharge Equipment Recommendations:  none  Barriers to discharge:  None    Assessment:     King Botello is a 40 y.o. female with a medical diagnosis of Nonrheumatic mitral valve stenosis.  She presents with progress towards goals as evidenced by decreased assistance needed with functional transfers and prolonged time tolerated for dynamic standing ADL. Performance deficits affecting function are impaired endurance, gait instability, impaired functional mobility, impaired self care skills, impaired balance, impaired cardiopulmonary response to activity, decreased upper extremity function, weakness.     Rehab Prognosis:  Good; patient would benefit from acute skilled OT services to address these deficits and reach maximum level of function.       Plan:     Patient to be seen 5 x/week to address the above listed problems via self-care/home management, therapeutic activities, therapeutic exercises  Plan of Care Expires: 01/16/24  Plan of Care Reviewed with: patient, spouse    Subjective     Chief Complaint: Denies; pt states she feels much better  Patient/Family Comments/goals: to go home  Pain/Comfort:  Pain Rating 1: 0/10  Pain Rating Post-Intervention 1: 0/10    Objective:     Communicated with: Rn prior to session.  Patient found up in chair with blood pressure cuff, pulse ox (continuous), telemetry, wound vac, external pacer upon OT entry to room.    General Precautions: Standard, fall, sternal    Orthopedic Precautions:   Braces:    Respiratory Status: Nasal cannula, flow NT L/min     Occupational Performance:     Functional Mobility/Transfers:  Patient completed Sit <> Stand Transfer with stand by assistance  with encouragement to complete on her own with therapist close by if  needed  Functional Mobility: SBA to/from bathroom and in hallway to simulate HH distances    Activities of Daily Living:  Feeding:  independence    Grooming: independence    Upper Body Dressing: supervision    Lower Body Dressing: minimum assistance    Toileting: contact guard assistance        [facility] 6 Click ADL: 20    Treatment & Education:  Pt ed on OT POC  Pt ed on PLB and use of pillow for splinting chest to allow for deep breaths  Pt ed on ROM ex's 3x daily for increased overall strength and endurance      Patient left up in chair with all lines intact, call button in reach, RN notified, and spouse present    GOALS:   Multidisciplinary Problems       Occupational Therapy Goals          Problem: Occupational Therapy    Goal Priority Disciplines Outcome Interventions   Occupational Therapy Goal     OT, PT/OT Ongoing, Progressing    Description: Goals to be met by: 1/16/24     Patient will increase functional independence with ADLs by performing:    Feeding with [place].  UE Dressing with Set-up Assistance.  LE Dressing with Set-up Assistance.  Grooming while standing at sink with Supervision.  Toileting from toilet with Supervision for hygiene and clothing management.   Toilet transfer to toilet with Supervision.                         Time Tracking:     OT Date of Treatment: 01/15/24  OT Start Time: 0948  OT Stop Time: 1012  OT Total Time (min): 24 min    Billable Minutes:Self Care/Home Management 10  Therapeutic Activity 13               1/15/2024   Negative

## 2024-02-26 NOTE — ED PROVIDER NOTE - PATIENT PORTAL LINK FT
You can access the FollowMyHealth Patient Portal offered by [facility] by registering at the following website: [email or web address]. By joining [facility]’s FollowMyHealth portal, you will also be able to view your health information using other applications (apps) compatible with our system.

## 2024-02-26 NOTE — ED ADULT TRIAGE NOTE - CHIEF COMPLAINT QUOTE
Patient presents to ED with c/o worsening generalized abdominal pain times 3 days.  (+) nausea  Denies vomiting/diarrhea  Last Tylenol about 8 hours PTA

## 2025-01-13 ENCOUNTER — EMERGENCY (EMERGENCY)
Facility: HOSPITAL | Age: 50
LOS: 1 days | Discharge: HOME | End: 2025-01-13
Attending: EMERGENCY MEDICINE
Payer: COMMERCIAL

## 2025-01-13 VITALS
HEART RATE: 89 BPM | OXYGEN SATURATION: 99 % | WEIGHT: 155.21 LBS | SYSTOLIC BLOOD PRESSURE: 122 MMHG | DIASTOLIC BLOOD PRESSURE: 78 MMHG | TEMPERATURE: 98 F | RESPIRATION RATE: 17 BRPM

## 2025-01-13 DIAGNOSIS — Z98.89 OTHER SPECIFIED POSTPROCEDURAL STATES: Chronic | ICD-10-CM

## 2025-01-13 DIAGNOSIS — Z98.51 TUBAL LIGATION STATUS: Chronic | ICD-10-CM

## 2025-01-13 DIAGNOSIS — O03.9 COMPLETE OR UNSPECIFIED SPONTANEOUS ABORTION WITHOUT COMPLICATION: Chronic | ICD-10-CM

## 2025-01-13 DIAGNOSIS — Z98.890 OTHER SPECIFIED POSTPROCEDURAL STATES: Chronic | ICD-10-CM

## 2025-01-13 PROCEDURE — 99284 EMERGENCY DEPT VISIT MOD MDM: CPT

## 2025-01-13 NOTE — ED ADULT TRIAGE NOTE - CHIEF COMPLAINT QUOTE
pt c/o right sided HA, states feeling sharp "shock" pain every 5 seconds, onset this evening.  Denies N/V/blurred vision/dizziness.  Denies injury. No pertinent medical hx.  10/10 pain.  Last med taken at noon.

## 2025-01-14 LAB
ALBUMIN SERPL ELPH-MCNC: 4 G/DL — SIGNIFICANT CHANGE UP (ref 3.3–5.2)
ALP SERPL-CCNC: 74 U/L — SIGNIFICANT CHANGE UP (ref 40–120)
ALT FLD-CCNC: 19 U/L — SIGNIFICANT CHANGE UP
ANION GAP SERPL CALC-SCNC: 10 MMOL/L — SIGNIFICANT CHANGE UP (ref 5–17)
AST SERPL-CCNC: 16 U/L — SIGNIFICANT CHANGE UP
BASOPHILS # BLD AUTO: 0.02 K/UL — SIGNIFICANT CHANGE UP (ref 0–0.2)
BASOPHILS NFR BLD AUTO: 0.2 % — SIGNIFICANT CHANGE UP (ref 0–2)
BILIRUB SERPL-MCNC: 0.2 MG/DL — LOW (ref 0.4–2)
BUN SERPL-MCNC: 9.2 MG/DL — SIGNIFICANT CHANGE UP (ref 8–20)
CALCIUM SERPL-MCNC: 9 MG/DL — SIGNIFICANT CHANGE UP (ref 8.4–10.5)
CHLORIDE SERPL-SCNC: 104 MMOL/L — SIGNIFICANT CHANGE UP (ref 96–108)
CO2 SERPL-SCNC: 25 MMOL/L — SIGNIFICANT CHANGE UP (ref 22–29)
CREAT SERPL-MCNC: 0.55 MG/DL — SIGNIFICANT CHANGE UP (ref 0.5–1.3)
EGFR: 113 ML/MIN/1.73M2 — SIGNIFICANT CHANGE UP
EOSINOPHIL # BLD AUTO: 0.08 K/UL — SIGNIFICANT CHANGE UP (ref 0–0.5)
EOSINOPHIL NFR BLD AUTO: 0.9 % — SIGNIFICANT CHANGE UP (ref 0–6)
GLUCOSE SERPL-MCNC: 92 MG/DL — SIGNIFICANT CHANGE UP (ref 70–99)
HCT VFR BLD CALC: 37.4 % — SIGNIFICANT CHANGE UP (ref 34.5–45)
HGB BLD-MCNC: 12.3 G/DL — SIGNIFICANT CHANGE UP (ref 11.5–15.5)
IMM GRANULOCYTES NFR BLD AUTO: 0.3 % — SIGNIFICANT CHANGE UP (ref 0–0.9)
LYMPHOCYTES # BLD AUTO: 2.18 K/UL — SIGNIFICANT CHANGE UP (ref 1–3.3)
LYMPHOCYTES # BLD AUTO: 23.3 % — SIGNIFICANT CHANGE UP (ref 13–44)
MAGNESIUM SERPL-MCNC: 2 MG/DL — SIGNIFICANT CHANGE UP (ref 1.6–2.6)
MCHC RBC-ENTMCNC: 27.2 PG — SIGNIFICANT CHANGE UP (ref 27–34)
MCHC RBC-ENTMCNC: 32.9 G/DL — SIGNIFICANT CHANGE UP (ref 32–36)
MCV RBC AUTO: 82.7 FL — SIGNIFICANT CHANGE UP (ref 80–100)
MONOCYTES # BLD AUTO: 0.83 K/UL — SIGNIFICANT CHANGE UP (ref 0–0.9)
MONOCYTES NFR BLD AUTO: 8.9 % — SIGNIFICANT CHANGE UP (ref 2–14)
NEUTROPHILS # BLD AUTO: 6.21 K/UL — SIGNIFICANT CHANGE UP (ref 1.8–7.4)
NEUTROPHILS NFR BLD AUTO: 66.4 % — SIGNIFICANT CHANGE UP (ref 43–77)
PLATELET # BLD AUTO: 199 K/UL — SIGNIFICANT CHANGE UP (ref 150–400)
POTASSIUM SERPL-MCNC: 3.8 MMOL/L — SIGNIFICANT CHANGE UP (ref 3.5–5.3)
POTASSIUM SERPL-SCNC: 3.8 MMOL/L — SIGNIFICANT CHANGE UP (ref 3.5–5.3)
PROT SERPL-MCNC: 7.2 G/DL — SIGNIFICANT CHANGE UP (ref 6.6–8.7)
RBC # BLD: 4.52 M/UL — SIGNIFICANT CHANGE UP (ref 3.8–5.2)
RBC # FLD: 13.7 % — SIGNIFICANT CHANGE UP (ref 10.3–14.5)
SODIUM SERPL-SCNC: 139 MMOL/L — SIGNIFICANT CHANGE UP (ref 135–145)
WBC # BLD: 9.35 K/UL — SIGNIFICANT CHANGE UP (ref 3.8–10.5)
WBC # FLD AUTO: 9.35 K/UL — SIGNIFICANT CHANGE UP (ref 3.8–10.5)

## 2025-01-14 PROCEDURE — 85025 COMPLETE CBC W/AUTO DIFF WBC: CPT

## 2025-01-14 PROCEDURE — 80053 COMPREHEN METABOLIC PANEL: CPT

## 2025-01-14 PROCEDURE — 99284 EMERGENCY DEPT VISIT MOD MDM: CPT | Mod: 25

## 2025-01-14 PROCEDURE — 96375 TX/PRO/DX INJ NEW DRUG ADDON: CPT

## 2025-01-14 PROCEDURE — 36415 COLL VENOUS BLD VENIPUNCTURE: CPT

## 2025-01-14 PROCEDURE — 70450 CT HEAD/BRAIN W/O DYE: CPT | Mod: MC

## 2025-01-14 PROCEDURE — 70450 CT HEAD/BRAIN W/O DYE: CPT | Mod: 26

## 2025-01-14 PROCEDURE — 96374 THER/PROPH/DIAG INJ IV PUSH: CPT

## 2025-01-14 PROCEDURE — 83735 ASSAY OF MAGNESIUM: CPT

## 2025-01-14 RX ORDER — AMOXICILLIN/POTASSIUM CLAV 875-125 MG
1 TABLET ORAL
Qty: 14 | Refills: 0
Start: 2025-01-14 | End: 2025-01-20

## 2025-01-14 RX ORDER — BUTALB/ACETAMINOPHEN/CAFFEINE 50-325-40
1 TABLET ORAL
Qty: 15 | Refills: 0
Start: 2025-01-14 | End: 2025-01-18

## 2025-01-14 RX ORDER — AMOXICILLIN/POTASSIUM CLAV 875-125 MG
1 TABLET ORAL ONCE
Refills: 0 | Status: COMPLETED | OUTPATIENT
Start: 2025-01-14 | End: 2025-01-14

## 2025-01-14 RX ORDER — FLUTICASONE PROPIONATE 50 UG/1
1 SPRAY, METERED NASAL
Qty: 1 | Refills: 0
Start: 2025-01-14 | End: 2025-01-27

## 2025-01-14 RX ORDER — ACETAMINOPHEN 80 MG/.8ML
1000 SOLUTION/ DROPS ORAL ONCE
Refills: 0 | Status: COMPLETED | OUTPATIENT
Start: 2025-01-14 | End: 2025-01-14

## 2025-01-14 RX ORDER — SODIUM CHLORIDE 9 MG/ML
1000 INJECTION, SOLUTION INTRAMUSCULAR; INTRAVENOUS; SUBCUTANEOUS ONCE
Refills: 0 | Status: COMPLETED | OUTPATIENT
Start: 2025-01-14 | End: 2025-01-14

## 2025-01-14 RX ORDER — METOCLOPRAMIDE 10 MG/1
10 TABLET ORAL ONCE
Refills: 0 | Status: COMPLETED | OUTPATIENT
Start: 2025-01-14 | End: 2025-01-14

## 2025-01-14 RX ADMIN — SODIUM CHLORIDE 1000 MILLILITER(S): 9 INJECTION, SOLUTION INTRAMUSCULAR; INTRAVENOUS; SUBCUTANEOUS at 01:39

## 2025-01-14 RX ADMIN — METOCLOPRAMIDE 104 MILLIGRAM(S): 10 TABLET ORAL at 01:39

## 2025-01-14 RX ADMIN — ACETAMINOPHEN 400 MILLIGRAM(S): 80 SOLUTION/ DROPS ORAL at 01:39

## 2025-01-14 RX ADMIN — Medication 1 TABLET(S): at 01:39

## 2025-01-14 NOTE — ED PROVIDER NOTE - OBJECTIVE STATEMENT
[address] [address]  Phone: [phone]  Fax: [phone]    RUBIO Khanna CNP        September 15, 5425     Patient: Faustina Tejada   YOB: 2000   Date of Visit: 9/15/2021       To Whom it May Concern:    Trell Escoto was seen in my clinic on 9/15/2021. She may return to school on 9/16/2021. Pt has chronic headaches and may need to miss class 1-2 days per month due to this condition. If you have any questions or concerns, please don't hesitate to call.     Sincerely,         RUBIO Khanna CNP [address] [facility]  [address] [place] [address]  Phone: [phone]  Fax: [phone]    RUBIO Vaughan CNP        September 15, 8602     Patient: Emigdio Anglin   YOB: 2000   Date of Visit: 9/15/2021       To Whom it May Concern:    Tigre Dee was seen in my clinic on 9/15/2021. She may return to school on 9/16/21 as long as COVID testing is negative. Off class 9/13/21-9/15/21. If you have any questions or concerns, please don't hesitate to call.     Sincerely,         RUBIO Vaughan CNP DISCHARGE 49-year-old female with no significant past medical history presented to the ED complaining of gradual onset right-sided, throbbing headache since earlier this evening.  Patient states that every 5 minutes she feels a intermittent sharp shooting pain on the right side of her head.  Patient with no history of headaches.  Of note, patient also complaining of right ear pain. Pt otherwise denies head trauma, LOC, dizziness, visual changes, photophobia, neck pain, hearing loss, tinnitus, fever/chills, c/p, sob, abd pain, n/v/c/d, dysuria, numbness/tingling/weakness and has no other complaints at this time.

## 2025-01-14 NOTE — ED PROVIDER NOTE - CLINICAL SUMMARY MEDICAL DECISION MAKING FREE TEXT BOX
49-year-old female with no significant past medical history presented to the ED complaining of gradual onset right-sided, throbbing headache since earlier this evening.  Patient states that every 5 minutes she feels a intermittent sharp shooting pain on the right side of her head.  Patient with no history of headaches.  Of note, patient also complaining of right ear pain.     Patient resting comfortably in no acute distress this time, reports significant relief presenting symptoms after medication.  CT head negative for acute pathology.  Physical examination reveals a right AOM, will treat with antibiotics.  Patient stable for discharge and ENT follow-up.  Return precaution discussed with patient.

## 2025-01-14 NOTE — ED ADULT NURSE NOTE - ALCOHOL PRE SCREEN (AUDIT - C)
[facility] [id number]  VIA Mail     [address]  [facility]  Lei Thacker [facility]  VIA In [place]
Statement Selected

## 2025-01-14 NOTE — ED PROVIDER NOTE - CARE PROVIDER_API CALL
Jose Lenz  Otolaryngology  [address] [place] [address], [address]  [place] [address]  Phone: [phone]  Fax: [phone]  Follow Up Time: 4-6 Days

## 2025-01-14 NOTE — ED ADULT NURSE NOTE - NS TRANSFER PATIENT BELONGINGS
Pt presents for Lanreotide injection. Pt with no new complaints. Pt reports not taking BP medication this AM as he ran out and will be picking up a refill today.    None

## 2025-01-14 NOTE — ED PROVIDER NOTE - NSFOLLOWUPINSTRUCTIONS_ED_ALL_ED_FT
"Per Dr. Villegas:  \"I reviewed this MRI with a few of the other cardiac MRI imagers to see if they thought is was more of an artifact. Dr. Rojo thought it was at least a small perfusion defect by MRI criteria. I have a low suspicion for obstructive coronary disease with his history of zero CT calcium in 2021 and this very focal area of basal inferolateral is atypical for high grade obstructive left circuflex artery.     It looks like he is now on dialysis from reviewing his chart? I would recommend coronary CTA as the last test for him to exonerate obstructive coronary artery lesion and would re-measure his CT calcium score on this test.\"  " Headache    A headache is pain or discomfort felt around the head or neck area. The specific cause of a headache may not be found as there are many types including tension headaches, migraine headaches, and cluster headaches. Watch your condition for any changes. Things you can do to manage your pain include taking over the counter and prescription medications as instructed by your health care provider, lying down in a dark quiet room, limiting stress, getting regular sleep, and refraining from alcohol and tobacco products.    SEEK IMMEDIATE MEDICAL CARE IF YOU HAVE ANY OF THE FOLLOWING SYMPTOMS: fever, vomiting, stiff neck, loss of vision, problems with speech, muscle weakness, loss of balance, trouble walking, passing out, or confusion.

## 2025-01-14 NOTE — ED ADULT NURSE NOTE - OBJECTIVE STATEMENT
Pt A&Ox4, resp wnl. Pt presents to the ED c/o right sided HA and sharp pain intermittently in the patient right side of head and face. Pt denies n/v/d, abdominal pain, back pain, trauma, LOC, blurred vision, dizziness. Pt denies PMHx. Pt medicated per orders, plan of care ongoing.

## 2025-01-14 NOTE — ED ADULT NURSE NOTE - NSFALLUNIVINTERV_ED_ALL_ED
Bed/Stretcher in lowest position, wheels locked, appropriate side rails in place/Call bell, personal items and telephone in reach/Instruct patient to call for assistance before getting out of bed/chair/stretcher/Non-slip footwear applied when patient is off stretcher/[place] to call system/Physically safe environment - no spills, clutter or unnecessary equipment/Purposeful proactive rounding/Room/bathroom lighting operational, light cord in reach

## 2025-01-14 NOTE — ED PROVIDER NOTE - PATIENT PORTAL LINK FT
You can access the FollowMyHealth Patient Portal offered by [facility] by registering at the following website: [email or web address]. By joining [email or web address]’s FollowMyHealth portal, you will also be able to view your health information using other applications (apps) compatible with our system.

## 2025-01-14 NOTE — ED ADULT NURSE NOTE - FINAL NURSING ELECTRONIC SIGNATURE
-Patient's blood pressure log within range, elevation while in clinic and believes related to whitecoat hypertension  -Discussed diet and exercise  -Current medication: Losartan 100 mg daily, Amlodipine 2.5 mg daily.  Discussed risks and benefits.  Continue to monitor at home.  Discussed goals  -Discussed importance of BP at goal to prevent further worsening of retinopathy   14-Jan-2025 02:40

## 2025-01-14 NOTE — ED PROVIDER NOTE - ATTENDING APP SHARED VISIT CONTRIBUTION OF CARE
I personally saw the patient with the PA, and completed the key components of the history and physical exam. I then discussed the management plan with the PA.     General No acute distress  HEENT positive right TM erythematous normal left TM no mastoid erythema or swelling bilaterally  Respiratory clear  Cardiac no murmur  Abdomen soft  Neuro no lateralizing deficits  Agree with PA plan     return to ed for intractable HA, persistent vomiting, or new onset motor/sensory deficits

## 2025-01-18 DIAGNOSIS — H66.91 OTITIS MEDIA, UNSPECIFIED, RIGHT EAR: ICD-10-CM

## 2025-01-18 DIAGNOSIS — H92.01 OTALGIA, RIGHT EAR: ICD-10-CM

## 2025-01-18 DIAGNOSIS — G43.909 MIGRAINE, UNSPECIFIED, NOT INTRACTABLE, WITHOUT STATUS MIGRAINOSUS: ICD-10-CM

## 2025-04-01 ENCOUNTER — EMERGENCY (EMERGENCY)
Facility: HOSPITAL | Age: 50
LOS: 1 days | Discharge: HOME | End: 2025-04-01
Attending: EMERGENCY MEDICINE
Payer: COMMERCIAL

## 2025-04-01 VITALS
SYSTOLIC BLOOD PRESSURE: 123 MMHG | HEART RATE: 70 BPM | TEMPERATURE: 98 F | RESPIRATION RATE: 28 BRPM | OXYGEN SATURATION: 98 % | DIASTOLIC BLOOD PRESSURE: 85 MMHG | WEIGHT: 166.45 LBS

## 2025-04-01 VITALS
RESPIRATION RATE: 18 BRPM | DIASTOLIC BLOOD PRESSURE: 80 MMHG | HEART RATE: 69 BPM | TEMPERATURE: 98 F | SYSTOLIC BLOOD PRESSURE: 128 MMHG | OXYGEN SATURATION: 100 %

## 2025-04-01 DIAGNOSIS — Z98.51 TUBAL LIGATION STATUS: Chronic | ICD-10-CM

## 2025-04-01 DIAGNOSIS — Z98.89 OTHER SPECIFIED POSTPROCEDURAL STATES: Chronic | ICD-10-CM

## 2025-04-01 DIAGNOSIS — O03.9 COMPLETE OR UNSPECIFIED SPONTANEOUS ABORTION WITHOUT COMPLICATION: Chronic | ICD-10-CM

## 2025-04-01 DIAGNOSIS — Z98.890 OTHER SPECIFIED POSTPROCEDURAL STATES: Chronic | ICD-10-CM

## 2025-04-01 LAB
APPEARANCE UR: CLEAR — SIGNIFICANT CHANGE UP
BACTERIA # UR AUTO: NEGATIVE /HPF — SIGNIFICANT CHANGE UP
BILIRUB UR-MCNC: NEGATIVE — SIGNIFICANT CHANGE UP
CAST: 0 /LPF — SIGNIFICANT CHANGE UP (ref 0–4)
COLOR SPEC: YELLOW — SIGNIFICANT CHANGE UP
DIFF PNL FLD: NEGATIVE — SIGNIFICANT CHANGE UP
FLUAV AG NPH QL: SIGNIFICANT CHANGE UP
FLUBV AG NPH QL: SIGNIFICANT CHANGE UP
GLUCOSE UR QL: NEGATIVE MG/DL — SIGNIFICANT CHANGE UP
KETONES UR-MCNC: NEGATIVE MG/DL — SIGNIFICANT CHANGE UP
LEUKOCYTE ESTERASE UR-ACNC: NEGATIVE — SIGNIFICANT CHANGE UP
NITRITE UR-MCNC: NEGATIVE — SIGNIFICANT CHANGE UP
PH UR: 6.5 — SIGNIFICANT CHANGE UP (ref 5–8)
PROT UR-MCNC: NEGATIVE MG/DL — SIGNIFICANT CHANGE UP
RBC CASTS # UR COMP ASSIST: 0 /HPF — SIGNIFICANT CHANGE UP (ref 0–4)
RSV RNA NPH QL NAA+NON-PROBE: SIGNIFICANT CHANGE UP
SARS-COV-2 RNA SPEC QL NAA+PROBE: SIGNIFICANT CHANGE UP
SOURCE RESPIRATORY: SIGNIFICANT CHANGE UP
SP GR SPEC: 1.01 — SIGNIFICANT CHANGE UP (ref 1–1.03)
SQUAMOUS # UR AUTO: 1 /HPF — SIGNIFICANT CHANGE UP (ref 0–5)
UROBILINOGEN FLD QL: 0.2 MG/DL — SIGNIFICANT CHANGE UP (ref 0.2–1)
WBC UR QL: 0 /HPF — SIGNIFICANT CHANGE UP (ref 0–5)

## 2025-04-01 PROCEDURE — 80053 COMPREHEN METABOLIC PANEL: CPT

## 2025-04-01 PROCEDURE — 87637 SARSCOV2&INF A&B&RSV AMP PRB: CPT

## 2025-04-01 PROCEDURE — 99284 EMERGENCY DEPT VISIT MOD MDM: CPT

## 2025-04-01 PROCEDURE — 84702 CHORIONIC GONADOTROPIN TEST: CPT

## 2025-04-01 PROCEDURE — 83690 ASSAY OF LIPASE: CPT

## 2025-04-01 PROCEDURE — 36415 COLL VENOUS BLD VENIPUNCTURE: CPT

## 2025-04-01 PROCEDURE — 81001 URINALYSIS AUTO W/SCOPE: CPT

## 2025-04-01 PROCEDURE — 87086 URINE CULTURE/COLONY COUNT: CPT

## 2025-04-01 PROCEDURE — 99283 EMERGENCY DEPT VISIT LOW MDM: CPT

## 2025-04-01 PROCEDURE — 85025 COMPLETE CBC W/AUTO DIFF WBC: CPT

## 2025-04-01 RX ORDER — MAGNESIUM, ALUMINUM HYDROXIDE 200-200 MG
30 TABLET,CHEWABLE ORAL ONCE
Refills: 0 | Status: COMPLETED | OUTPATIENT
Start: 2025-04-01 | End: 2025-04-01

## 2025-04-01 RX ORDER — ONDANSETRON HCL/PF 4 MG/2 ML
4 VIAL (ML) INJECTION ONCE
Refills: 0 | Status: COMPLETED | OUTPATIENT
Start: 2025-04-01 | End: 2025-04-01

## 2025-04-01 RX ORDER — ACETAMINOPHEN 500 MG/5ML
650 LIQUID (ML) ORAL ONCE
Refills: 0 | Status: COMPLETED | OUTPATIENT
Start: 2025-04-01 | End: 2025-04-01

## 2025-04-01 RX ADMIN — Medication 4 MILLIGRAM(S): at 23:27

## 2025-04-01 RX ADMIN — Medication 650 MILLIGRAM(S): at 22:37

## 2025-04-01 RX ADMIN — Medication 30 MILLILITER(S): at 22:37

## 2025-04-01 RX ADMIN — Medication 20 MILLIGRAM(S): at 22:37

## 2025-04-01 NOTE — ED PROVIDER NOTE - NS ED ROS FT
Gen: denies fever, chills, fatigue, weight loss  Skin: denies rashes, laceration, bruising  HEENT: denies visual changes, ear pain, nasal congestion, throat pain  Respiratory: denies ZIMMER, SOB, cough, wheezing  Cardiovascular: denies chest pain, palpitations, diaphoresis, LE edema  GI: +abdominal pain  : denies dysuria, frequency, urgency, bowel/bladder incontinence  MSK: denies joint swelling/pain, back pain, neck pain  Neuro: denies headache, dizziness, weakness, numbness

## 2025-04-01 NOTE — ED PROVIDER NOTE - PATIENT PORTAL LINK FT
You can access the FollowMyHealth Patient Portal offered by [facility] by registering at the following website: [email or web address]. By joining [facility]’s FollowMyHealth portal, you will also be able to view your health information using other applications (apps) compatible with our system.

## 2025-04-01 NOTE — ED PROVIDER NOTE - NSFOLLOWUPINSTRUCTIONS_ED_ALL_ED_FT
Please follow up with GI specialist within 3 days.    Abdominal Pain    Many things can cause abdominal pain. Many times, abdominal pain is not caused by a disease and will improve without treatment. Your health care provider will do a physical exam to determine if there is a dangerous cause of your pain; blood tests and imaging may help determine the cause of your pain. However, in many cases, no cause may be found and you may need further testing as an outpatient. Monitor your abdominal pain for any changes.     SEEK IMMEDIATE MEDICAL CARE IF YOU HAVE ANY OF THE FOLLOWING SYMPTOMS: worsening abdominal pain, uncontrollable vomiting, profuse diarrhea, inability to have bowel movements or pass gas, black or bloody stools, fever accompanying chest pain or back pain, or fainting. These symptoms may represent a serious problem that is an emergency. Do not wait to see if the symptoms will go away. Get medical help right away. Call 911 and do not drive yourself to the hospital.

## 2025-04-01 NOTE — ED PROVIDER NOTE - OBJECTIVE STATEMENT
48yo female with pmhx fibroids, 5 csections, hysterectomy 7 years ago presents to ED c/o generalized abdominal pain x 7 years. Pt states that since her hysterectomy 7 years ago pt goes through "episodes" of abdominal pain that come and go every few months. Pt states that her "episode" started yesterday, also has been "spitting up", states that she has had increase in saliva that she feels "comes up." Pt states she took tylenol today for her pain. Pt states she last had a bowel movement today which was diarrhea, still passing gas today. Pt also endorses foul smelling urine that started yesterday as well. Pt states her son is currently sick with flu like symptoms here in ED with her. Pt denies any fever, chest pain, SOB, nausea, vomiting, diarrhea. Pt denies any other complaints at this time.

## 2025-04-01 NOTE — ED PROVIDER NOTE - CLINICAL SUMMARY MEDICAL DECISION MAKING FREE TEXT BOX
48yo female with pmhx fibroids, 5 csections, hysterectomy 7 years ago presents to ED c/o generalized abdominal pain x 7 years. Pt states that since her hysterectomy 7 years ago pt goes through "episodes" of abdominal pain that come and go every few months. Pt states that her "episode" started yesterday, also has been "spitting up", states that she has had increase in saliva that she feels "comes up." Pt requesting no IV or IV medicine, wants labs and oral meds only. 50yo female with pmhx fibroids, 5 csections, hysterectomy 7 years ago presents to ED c/o generalized abdominal pain x 7 years. Pt states that since her hysterectomy 7 years ago pt goes through "episodes" of abdominal pain that come and go every few months. Pt states that her "episode" started yesterday, also has been "spitting up", states that she has had increase in saliva that she feels "comes up." Pt requesting no IV, no IV medicine, no CT, wants labs and oral meds only. ED labs reviewed, labs unremarkable. Abdominal benign, no focal tenderness. Given chronic nature of abdominal pain, CT abd pelvis 1 year ago negative for acute pathology, giving GI follow up. On reassessment pt states she feels much better after meds. Pt expresses desire for discharge. Return precautions discussed with patient.

## 2025-04-01 NOTE — ED PROVIDER NOTE - CARE PROVIDER_API CALL
Hasmukh Cerna  Gastroenterology  [address] [facility] [address]  [place] [address]  Phone: [phone]  Fax: [phone]  Follow Up Time:

## 2025-04-01 NOTE — ED ADULT TRIAGE NOTE - CHIEF COMPLAINT QUOTE
pt c/o generalized abdominal pain and spitting up x3 days, denies n/v/d or cp or urinary symptoms. . no meds PTA. pmhx of hysterectomy

## 2025-04-01 NOTE — ED PROVIDER NOTE - ATTENDING APP SHARED VISIT CONTRIBUTION OF CARE
I, Max Nowak, performed a face to face bedside interview with this patient regarding history of present illness, and completed an independent physical examination. I personally made/approved the management plan and take responsibility for the patient management. I have communicated the patient’s plan of care and disposition with the ACP.  49 year old female presents with abd pain. Pt reports she gets intermittent episodes fo abd cramping and nausea for the past 7 years, and another episode started yesterday. no fevers, urinary Sx, melena  Gen: NAD, well appearing  CV: RRR  Pul: CTA b/l  Abd: Soft, non-distended, non-tender  Neuro: no focal deficits  Pt improved, lab without acute pathology, abd non-tender, feels better and tolerating PO, stable for dc

## 2025-04-02 LAB
ALBUMIN SERPL ELPH-MCNC: 4.2 G/DL — SIGNIFICANT CHANGE UP (ref 3.3–5.2)
ALP SERPL-CCNC: 83 U/L — SIGNIFICANT CHANGE UP (ref 40–120)
ALT FLD-CCNC: 20 U/L — SIGNIFICANT CHANGE UP
ANION GAP SERPL CALC-SCNC: 12 MMOL/L — SIGNIFICANT CHANGE UP (ref 5–17)
AST SERPL-CCNC: 17 U/L — SIGNIFICANT CHANGE UP
BASOPHILS # BLD AUTO: 0.02 K/UL — SIGNIFICANT CHANGE UP (ref 0–0.2)
BASOPHILS NFR BLD AUTO: 0.2 % — SIGNIFICANT CHANGE UP (ref 0–2)
BILIRUB SERPL-MCNC: <0.2 MG/DL — LOW (ref 0.4–2)
BUN SERPL-MCNC: 12.2 MG/DL — SIGNIFICANT CHANGE UP (ref 8–20)
CALCIUM SERPL-MCNC: 9 MG/DL — SIGNIFICANT CHANGE UP (ref 8.4–10.5)
CHLORIDE SERPL-SCNC: 101 MMOL/L — SIGNIFICANT CHANGE UP (ref 96–108)
CO2 SERPL-SCNC: 24 MMOL/L — SIGNIFICANT CHANGE UP (ref 22–29)
CREAT SERPL-MCNC: 0.57 MG/DL — SIGNIFICANT CHANGE UP (ref 0.5–1.3)
EGFR: 112 ML/MIN/1.73M2 — SIGNIFICANT CHANGE UP
EGFR: 112 ML/MIN/1.73M2 — SIGNIFICANT CHANGE UP
EOSINOPHIL # BLD AUTO: 0.07 K/UL — SIGNIFICANT CHANGE UP (ref 0–0.5)
EOSINOPHIL NFR BLD AUTO: 0.9 % — SIGNIFICANT CHANGE UP (ref 0–6)
GLUCOSE SERPL-MCNC: 82 MG/DL — SIGNIFICANT CHANGE UP (ref 70–99)
HCG SERPL-ACNC: <4 MIU/ML — SIGNIFICANT CHANGE UP
HCT VFR BLD CALC: 39 % — SIGNIFICANT CHANGE UP (ref 34.5–45)
HGB BLD-MCNC: 12.7 G/DL — SIGNIFICANT CHANGE UP (ref 11.5–15.5)
IMM GRANULOCYTES # BLD AUTO: 0.02 K/UL — SIGNIFICANT CHANGE UP (ref 0–0.07)
IMM GRANULOCYTES NFR BLD AUTO: 0.2 % — SIGNIFICANT CHANGE UP (ref 0–0.9)
LIDOCAIN IGE QN: 39 U/L — SIGNIFICANT CHANGE UP (ref 22–51)
LYMPHOCYTES # BLD AUTO: 2.23 K/UL — SIGNIFICANT CHANGE UP (ref 1–3.3)
LYMPHOCYTES NFR BLD AUTO: 27.3 % — SIGNIFICANT CHANGE UP (ref 13–44)
MCHC RBC-ENTMCNC: 27.2 PG — SIGNIFICANT CHANGE UP (ref 27–34)
MCHC RBC-ENTMCNC: 32.6 G/DL — SIGNIFICANT CHANGE UP (ref 32–36)
MCV RBC AUTO: 83.5 FL — SIGNIFICANT CHANGE UP (ref 80–100)
MONOCYTES # BLD AUTO: 0.66 K/UL — SIGNIFICANT CHANGE UP (ref 0–0.9)
MONOCYTES NFR BLD AUTO: 8.1 % — SIGNIFICANT CHANGE UP (ref 2–14)
NEUTROPHILS # BLD AUTO: 5.18 K/UL — SIGNIFICANT CHANGE UP (ref 1.8–7.4)
NEUTROPHILS NFR BLD AUTO: 63.3 % — SIGNIFICANT CHANGE UP (ref 43–77)
NRBC # BLD AUTO: 0 K/UL — SIGNIFICANT CHANGE UP (ref 0–0)
NRBC # FLD: 0 K/UL — SIGNIFICANT CHANGE UP (ref 0–0)
NRBC BLD AUTO-RTO: 0 /100 WBCS — SIGNIFICANT CHANGE UP (ref 0–0)
PLATELET # BLD AUTO: 248 K/UL — SIGNIFICANT CHANGE UP (ref 150–400)
PMV BLD: 9.8 FL — SIGNIFICANT CHANGE UP (ref 7–13)
POTASSIUM SERPL-MCNC: 4.1 MMOL/L — SIGNIFICANT CHANGE UP (ref 3.5–5.3)
POTASSIUM SERPL-SCNC: 4.1 MMOL/L — SIGNIFICANT CHANGE UP (ref 3.5–5.3)
PROT SERPL-MCNC: 7.2 G/DL — SIGNIFICANT CHANGE UP (ref 6.6–8.7)
RBC # BLD: 4.67 M/UL — SIGNIFICANT CHANGE UP (ref 3.8–5.2)
RBC # FLD: 13.8 % — SIGNIFICANT CHANGE UP (ref 10.3–14.5)
SODIUM SERPL-SCNC: 137 MMOL/L — SIGNIFICANT CHANGE UP (ref 135–145)
WBC # BLD: 8.18 K/UL — SIGNIFICANT CHANGE UP (ref 3.8–10.5)
WBC # FLD AUTO: 8.18 K/UL — SIGNIFICANT CHANGE UP (ref 3.8–10.5)

## 2025-04-02 NOTE — ED ADULT NURSE NOTE - CAS EDP DISCH TYPE
Call [employer] at [phone]. They will screen you for any insurance you might qualify for. This is the first step before you can receive discounts at the [facility] or [employer] specialists. -CALL THIS #  TO HELP WITH BILLS FROM [facility]    TENNIS ELBOW BRACE TO HELP WITH PAIN IN LOWER ARM       CARPAL TUNNNEL BRACE - WEAR ALL DAY AND NIGHT FOR FINGER NUMBNESS     Shoulder Blade: Exercises  Your Care Instructions  Here are some examples of typical exercises for your condition. Start each exercise slowly. Ease off the exercise if you start to have pain. Your doctor or physical therapist will tell you when you can start these exercises and which ones will work best for you. How to do the exercises  Shoulder roll    1. Stand tall with your chin slightly tucked. Imagine that a string at the top of your head is pulling you straight up. 2. Keep your arms relaxed. All motion will be in your shoulders. 3. Shrug your shoulders up toward your ears, then up and back. [place] your shoulders down and back, like you're sliding your hands down into your back pants pockets. 4. Repeat the circles at least 2 to 4 times. This exercise is also helpful anytime you want to relax. Lower neck and upper back stretch    1. With your arms about shoulder height, clasp your hands in front of you. 2. Drop your chin toward your chest.  3. Reach straight forward so you are rounding your upper back. Think about pulling your shoulder blades apart. Dalila Narda feel a stretch across your upper back and shoulders. Hold for at least 6 seconds. 4. Repeat 2 to 4 times. Triceps stretch    1. Reach your arm straight up. 2. Keeping your elbow in place, bend your arm and reach your hand down behind your back. 3. With your other hand, apply gentle pressure to the bent elbow. Dalila Narda feel a stretch at the back of your upper arm and shoulder. Hold about 6 seconds. 4. Repeat 2 to 4 times with each arm.   Shoulder stretch    1. Relax your shoulders. 2. Raise one arm to shoulder height, and reach it across your chest.  3. Pull the arm slightly toward you with your other arm. This will help you get a gentle stretch. Hold for about 6 seconds. 4. Repeat 2 to 4 times. Shoulder blade squeeze    1. Sit or stand up tall with your arms at your sides. 2. Keep your shoulders relaxed and down, not shrugged. 3. Squeeze your shoulder blades together. Hold for 6 seconds, then relax. 4. Repeat 8 to 12 times. Straight-arm shoulder blade squeeze    1. Sit or stand tall. Relax your shoulders. 2. With palms down, hold your elastic tubing or band straight out in front of you. 3. Start with slight tension in the tubing or band, with your hands about shoulder-width apart. 4. Slowly pull straight out to the sides, squeezing your shoulder blades together. Keep your arms straight and at shoulder height. Slowly release. 5. Repeat 8 to 12 times. Rowing    1. [place] your elastic tubing or band at about waist height. Take one end in each hand. 2. Sit or stand with your feet hip-width apart. 3. Hold your arms straight in front of you. Adjust your distance to create slight tension in the tubing or band. 4. Slightly tuck your chin. Relax your shoulders. 5. Without shrugging your shoulders, pull straight back. Your elbows will pass alongside your waist.  Pull-downs    1. [place] your elastic tubing or band in the top of a closed door. Take one end in each hand. 2. Either sit or stand, depending on what is more comfortable. If you feel unsteady, sit on a chair. 3. Start with your arms up and comfortably apart, elbows straight. There should be a slight tension in the tubing or band. 4. Slightly tuck your chin, and look straight ahead. 5. Keeping your back straight, slowly pull down and back, bending your elbows. 6. Stop where your hands are level with your chin, in a \"goalpost\" position. 7. Repeat 8 to 12 times. Chest T stretch    1. Lie on your back.  Raise your knees so they are bent. Plant your feet on the floor, hip-width apart. 2. Tuck your chin, and relax your shoulders. 3. Reach your arms straight out to the sides. If you don't feel a mild stretch in your shoulders and across your chest, use a foam roll or a tightly rolled blanket under your spine, from your tailbone to your head. 4. Relax in this position for at least 15 to 30 seconds while you breathe normally. Repeat 2 to 4 times. As you get used to this stretch, keep adding a little more time until you are able relax in this position for 2 or 3 minutes. When you can relax for at least 2 minutes, you only need to do the exercise 1 time per session. Chest goalpost stretch    1. Lie on your back. Raise your knees so they are bent. Plant your feet on the floor, hip-width apart. 2. Tuck your chin, and relax your shoulders. 3. Reach your arms straight out to the sides. 4. Bend your arms at the elbows, with your hands pointed toward the top of your head. Your arms should make an L on either side of your head. Your palms should be facing up. 5. If you don't feel a mild stretch in your shoulders and across your chest, use a foam roll or tightly rolled blanket under your spine, from your tailbone to your head. 6. Relax in this position for at least 15 to 30 seconds while you breathe normally. Repeat 2 to 4 times. Each day you do this exercise, add a little more time until you can relax in this position for 2 or 3 minutes. When you can relax for at least 2 minutes, you only need to do the exercise 1 time per session. Follow-up care is a key part of your treatment and safety. Be sure to make and go to all appointments, and call your doctor if you are having problems. It's also a good idea to know your test results and keep a list of the medicines you take. Carpal Tunnel Syndrome: Exercises  Your Care Instructions  Here are some examples of typical rehabilitation exercises for your condition.  Start each exercise slowly. Ease off the exercise if you start to have pain. Your doctor or your physical or occupational therapist will tell you when you can start these exercises and which ones will work best for you. How to do the exercises  Note: When you no longer have pain or numbness, you can do exercises to help prevent carpal tunnel syndrome from coming back. Do not do any stretch or movement that is uncomfortable or painful. Warm-up stretches  5. Rotate your wrist up, down, and from side to side. Repeat 4 times. 6. Stretch your fingers far apart. Relax them, and then stretch them again. Repeat 4 times. 7. Stretch your thumb by pulling it back gently, holding it, and then releasing it. Repeat 4 times. Prayer stretch    5. Start with your palms together in front of your chest just below your chin. 6. Slowly lower your hands toward your waistline, keeping your hands close to your stomach and your palms together until you feel a mild to moderate stretch under your forearms. 7. Hold for at least 15 to 30 seconds. Repeat 2 to 4 times. Wrist flexor stretch    5. Extend your arm in front of you with your palm up. 6. Bend your wrist, pointing your hand toward the floor. 7. With your other hand, gently bend your wrist farther until you feel a mild to moderate stretch in your forearm. 8. Hold for at least 15 to 30 seconds. Repeat 2 to 4 times. Wrist extensor stretch    Repeat steps 1 through 4 of the stretch above, but begin with your extended hand palm down. Follow-up care is a key part of your treatment and safety. Be sure to make and go to all appointments, and call your doctor if you are having problems. It's also a good idea to know your test results and keep a list of the medicines you take. Where can you learn more? Go to http://dory-megan.info/. Enter V196 in the search box to learn more about \"Carpal Tunnel Syndrome: Exercises. \"  Current as of: March 21, 2017  Content Version: 11.3  © 1945-2244 [employer]. Care instructions adapted under license by [employer]* (which disclaims liability or warranty for this information). If you have questions about a medical condition or this instruction, always ask your healthcare professional. [address] any warranty or liability for your use of this information. Call [employer] at [phone]. They will screen you for any insurance you might qualify for. This is the first step before you can receive discounts at the [facility] or [facility] specialists. Additional contact numbers for [facility] are below:   For [place]/[facility] patients: [phone]  For [place]/[facility] patients: [phone]  For [place]: [phone] Home

## 2025-04-02 NOTE — ED ADULT NURSE NOTE - OBJECTIVE STATEMENT
Patient presents to ED complaining of ongoing episodes of abdominal pain for 7 years. Patient A&O x4, denies CP/SOB, no acute distress noted, safety maintained. Patient updated on plan of care.

## 2025-04-02 NOTE — ED ADULT NURSE NOTE - NS PRO PASSIVE SMOKE EXP
Unknown Bed in lowest position, wheels locked, appropriate side rails in place/Call bell, personal items and telephone in reach/Instruct patient to call for assistance before getting out of bed or chair/Non-slip footwear when patient is out of bed/[place] to call system/Physically safe environment - no spills, clutter or unnecessary equipment/Purposeful Proactive Rounding/Room/bathroom lighting operational, light cord in reach

## 2025-04-03 LAB
CULTURE RESULTS: SIGNIFICANT CHANGE UP
SPECIMEN SOURCE: SIGNIFICANT CHANGE UP

## 2025-07-22 ENCOUNTER — APPOINTMENT (OUTPATIENT)
Dept: BREAST CENTER | Facility: CLINIC | Age: 50
End: 2025-07-22
Payer: SELF-PAY

## 2025-07-22 VITALS — WEIGHT: 160 LBS | BODY MASS INDEX: 27.31 KG/M2 | HEIGHT: 64 IN

## 2025-07-22 DIAGNOSIS — Z86.000 PERSONAL HISTORY OF IN-SITU NEOPLASM OF BREAST: ICD-10-CM

## 2025-07-22 PROCEDURE — 99205 OFFICE O/P NEW HI 60 MIN: CPT | Mod: 25

## 2025-07-22 PROCEDURE — 76642 ULTRASOUND BREAST LIMITED: CPT | Mod: LT

## 2025-08-06 ENCOUNTER — APPOINTMENT (OUTPATIENT)
Dept: RADIATION ONCOLOGY | Facility: CLINIC | Age: 50
End: 2025-08-06
Payer: COMMERCIAL

## 2025-08-06 VITALS
SYSTOLIC BLOOD PRESSURE: 111 MMHG | WEIGHT: 159 LBS | OXYGEN SATURATION: 97 % | RESPIRATION RATE: 16 BRPM | HEART RATE: 94 BPM | DIASTOLIC BLOOD PRESSURE: 71 MMHG | BODY MASS INDEX: 27.14 KG/M2 | HEIGHT: 64 IN

## 2025-08-06 DIAGNOSIS — D05.12 INTRADUCTAL CARCINOMA IN SITU OF LEFT BREAST: ICD-10-CM

## 2025-08-06 DIAGNOSIS — Z78.9 OTHER SPECIFIED HEALTH STATUS: ICD-10-CM

## 2025-08-06 PROCEDURE — 99204 OFFICE O/P NEW MOD 45 MIN: CPT

## 2025-08-12 ENCOUNTER — RESULT REVIEW (OUTPATIENT)
Age: 50
End: 2025-08-12

## 2025-08-12 ENCOUNTER — OUTPATIENT (OUTPATIENT)
Dept: OUTPATIENT SERVICES | Facility: HOSPITAL | Age: 50
LOS: 1 days | End: 2025-08-12
Payer: COMMERCIAL

## 2025-08-12 ENCOUNTER — APPOINTMENT (OUTPATIENT)
Dept: MRI IMAGING | Facility: CLINIC | Age: 50
End: 2025-08-12
Payer: COMMERCIAL

## 2025-08-12 DIAGNOSIS — Z98.89 OTHER SPECIFIED POSTPROCEDURAL STATES: Chronic | ICD-10-CM

## 2025-08-12 DIAGNOSIS — Z98.51 TUBAL LIGATION STATUS: Chronic | ICD-10-CM

## 2025-08-12 DIAGNOSIS — Z98.890 OTHER SPECIFIED POSTPROCEDURAL STATES: Chronic | ICD-10-CM

## 2025-08-12 DIAGNOSIS — O03.9 COMPLETE OR UNSPECIFIED SPONTANEOUS ABORTION WITHOUT COMPLICATION: Chronic | ICD-10-CM

## 2025-08-12 DIAGNOSIS — D05.12 INTRADUCTAL CARCINOMA IN SITU OF LEFT BREAST: ICD-10-CM

## 2025-08-12 PROCEDURE — C8937: CPT

## 2025-08-12 PROCEDURE — 77049 MRI BREAST C-+ W/CAD BI: CPT | Mod: 26

## 2025-08-12 PROCEDURE — C8908: CPT

## 2025-08-12 PROCEDURE — A9585: CPT

## 2025-08-14 ENCOUNTER — NON-APPOINTMENT (OUTPATIENT)
Age: 50
End: 2025-08-14

## 2025-08-19 ENCOUNTER — APPOINTMENT (OUTPATIENT)
Dept: BREAST CENTER | Facility: CLINIC | Age: 50
End: 2025-08-19
Payer: COMMERCIAL

## 2025-08-19 VITALS — SYSTOLIC BLOOD PRESSURE: 131 MMHG | DIASTOLIC BLOOD PRESSURE: 86 MMHG | HEART RATE: 93 BPM

## 2025-08-19 DIAGNOSIS — N64.89 OTHER SPECIFIED DISORDERS OF BREAST: ICD-10-CM

## 2025-08-19 DIAGNOSIS — D05.12 INTRADUCTAL CARCINOMA IN SITU OF LEFT BREAST: ICD-10-CM

## 2025-08-19 PROCEDURE — 99214 OFFICE O/P EST MOD 30 MIN: CPT

## 2025-08-29 ENCOUNTER — NON-APPOINTMENT (OUTPATIENT)
Age: 50
End: 2025-08-29

## 2025-09-09 DIAGNOSIS — R92.0 MAMMOGRAPHIC MICROCALCIFICATION FOUND ON DIAGNOSTIC IMAGING OF BREAST: ICD-10-CM

## 2025-09-09 DIAGNOSIS — N63.0 UNSPECIFIED LUMP IN UNSPECIFIED BREAST: ICD-10-CM

## 2025-09-17 ENCOUNTER — APPOINTMENT (OUTPATIENT)
Dept: MAMMOGRAPHY | Facility: CLINIC | Age: 50
End: 2025-09-17
Payer: COMMERCIAL

## 2025-09-17 ENCOUNTER — RESULT REVIEW (OUTPATIENT)
Age: 50
End: 2025-09-17

## 2025-09-17 ENCOUNTER — APPOINTMENT (OUTPATIENT)
Dept: ULTRASOUND IMAGING | Facility: CLINIC | Age: 50
End: 2025-09-17
Payer: COMMERCIAL

## 2025-09-17 PROCEDURE — 19084 BX BREAST ADD LESION US IMAG: CPT | Mod: RT

## 2025-09-17 PROCEDURE — 77066 DX MAMMO INCL CAD BI: CPT | Mod: 26

## 2025-09-17 PROCEDURE — 19083 BX BREAST 1ST LESION US IMAG: CPT | Mod: LT
